# Patient Record
Sex: FEMALE | Race: WHITE | NOT HISPANIC OR LATINO | URBAN - METROPOLITAN AREA
[De-identification: names, ages, dates, MRNs, and addresses within clinical notes are randomized per-mention and may not be internally consistent; named-entity substitution may affect disease eponyms.]

---

## 2017-02-27 ENCOUNTER — EMERGENCY (EMERGENCY)
Facility: HOSPITAL | Age: 32
LOS: 0 days | Discharge: HOME | End: 2017-02-27

## 2017-06-27 DIAGNOSIS — O20.0 THREATENED ABORTION: ICD-10-CM

## 2017-06-27 DIAGNOSIS — Z98.890 OTHER SPECIFIED POSTPROCEDURAL STATES: ICD-10-CM

## 2017-06-27 DIAGNOSIS — Z3A.11 11 WEEKS GESTATION OF PREGNANCY: ICD-10-CM

## 2017-06-27 DIAGNOSIS — N93.9 ABNORMAL UTERINE AND VAGINAL BLEEDING, UNSPECIFIED: ICD-10-CM

## 2017-06-27 DIAGNOSIS — R10.2 PELVIC AND PERINEAL PAIN: ICD-10-CM

## 2021-07-12 PROBLEM — Z00.00 ENCOUNTER FOR PREVENTIVE HEALTH EXAMINATION: Status: ACTIVE | Noted: 2021-07-12

## 2021-07-19 ENCOUNTER — APPOINTMENT (OUTPATIENT)
Dept: SURGERY | Facility: CLINIC | Age: 36
End: 2021-07-19
Payer: COMMERCIAL

## 2021-07-19 VITALS
WEIGHT: 144 LBS | DIASTOLIC BLOOD PRESSURE: 70 MMHG | BODY MASS INDEX: 25.52 KG/M2 | HEART RATE: 70 BPM | SYSTOLIC BLOOD PRESSURE: 100 MMHG | TEMPERATURE: 97.8 F | HEIGHT: 63 IN

## 2021-07-19 DIAGNOSIS — Z78.9 OTHER SPECIFIED HEALTH STATUS: ICD-10-CM

## 2021-07-19 DIAGNOSIS — K58.9 IRRITABLE BOWEL SYNDROME W/OUT DIARRHEA: ICD-10-CM

## 2021-07-19 DIAGNOSIS — K64.5 PERIANAL VENOUS THROMBOSIS: ICD-10-CM

## 2021-07-19 DIAGNOSIS — K59.09 OTHER CONSTIPATION: ICD-10-CM

## 2021-07-19 PROCEDURE — 99203 OFFICE O/P NEW LOW 30 MIN: CPT

## 2021-07-19 RX ORDER — ESCITALOPRAM OXALATE 5 MG/1
5 TABLET ORAL
Qty: 30 | Refills: 0 | Status: ACTIVE | COMMUNITY
Start: 2021-01-13

## 2021-07-19 RX ORDER — IBUPROFEN 600 MG/1
600 TABLET, FILM COATED ORAL
Qty: 28 | Refills: 0 | Status: ACTIVE | COMMUNITY
Start: 2021-06-21

## 2021-07-19 RX ORDER — CLOBETASOL PROPIONATE 0.5 MG/G
0.05 CREAM TOPICAL
Qty: 45 | Refills: 0 | Status: ACTIVE | COMMUNITY
Start: 2021-05-04

## 2021-07-19 RX ORDER — HYDROCORTISONE 25 MG/G
2.5 CREAM TOPICAL
Qty: 28 | Refills: 0 | Status: ACTIVE | COMMUNITY
Start: 2021-06-14

## 2021-07-19 RX ORDER — ADAPALENE AND BENZOYL PEROXIDE 1; 25 MG/G; MG/G
0.1-2.5 GEL TOPICAL
Qty: 45 | Refills: 0 | Status: ACTIVE | COMMUNITY
Start: 2021-07-02

## 2021-07-19 NOTE — PHYSICAL EXAM
[Abdomen Masses] : No abdominal masses [Abdomen Tenderness] : ~T No ~M abdominal tenderness [No HSM] : no hepatosplenomegaly [None] : no anal fissures seen [Excoriation] : no perianal excoriation [Fistula] : no fistulas [Wart] : no warts [Ulcer ___ cm] : no ulcers [Pilonidal Cyst] : no pilonidal cysts [Tender, Swollen] : nontender, non-swollen [Thrombosed] : that was not thrombosed [Skin Tags] : there were no residual hemorrhoidal skin tags seen [Respiratory Effort] : normal respiratory effort [Normal Rate and Rhythm] : normal rate and rhythm [de-identified] : external examination shows a healed incision in the left anteriolateral location [de-identified] : awake, alert and in no acute distress

## 2021-07-19 NOTE — HISTORY OF PRESENT ILLNESS
[FreeTextEntry1] : Patient is a 36F with PMH of constipation who presents for second opinion s/p in office excision of thrombosed external hemorrhoid. Patient states that 6 weeks ago she had a thrombosed external hemorrhoid that was resected by DR. Rhodes. She states this was the second time this has happened. she has previously had issues with constipation but now has daily BM with the use of magnesium.  Patient denies fevers, chills, nausea, vomiting, abdominal pain, diarrhea, blood in his stool or unexpected weight loss.  Patient denies a family history of colon cancer rectal cancer or inflammatory bowel disease.  Patient has never had a colonoscopy.\par

## 2021-07-19 NOTE — ASSESSMENT
[FreeTextEntry1] : 36F s/p excision of thrombosed hemorrhoid\par \par Patient is healing well.  I recommended an increase in water intake, a high fiber diet and fiber supplement to improve her bowel habits.  We will see her back as needed.

## 2021-08-02 ENCOUNTER — APPOINTMENT (OUTPATIENT)
Dept: GASTROENTEROLOGY | Facility: CLINIC | Age: 36
End: 2021-08-02

## 2021-08-09 ENCOUNTER — EMERGENCY (EMERGENCY)
Facility: HOSPITAL | Age: 36
LOS: 0 days | Discharge: HOME | End: 2021-08-09
Attending: EMERGENCY MEDICINE | Admitting: EMERGENCY MEDICINE
Payer: COMMERCIAL

## 2021-08-09 VITALS
WEIGHT: 145.06 LBS | SYSTOLIC BLOOD PRESSURE: 115 MMHG | TEMPERATURE: 98 F | HEIGHT: 64 IN | OXYGEN SATURATION: 100 % | DIASTOLIC BLOOD PRESSURE: 70 MMHG | RESPIRATION RATE: 18 BRPM | HEART RATE: 79 BPM

## 2021-08-09 VITALS — HEART RATE: 88 BPM | SYSTOLIC BLOOD PRESSURE: 122 MMHG | DIASTOLIC BLOOD PRESSURE: 74 MMHG

## 2021-08-09 DIAGNOSIS — R10.13 EPIGASTRIC PAIN: ICD-10-CM

## 2021-08-09 DIAGNOSIS — R11.0 NAUSEA: ICD-10-CM

## 2021-08-09 DIAGNOSIS — Z98.890 OTHER SPECIFIED POSTPROCEDURAL STATES: Chronic | ICD-10-CM

## 2021-08-09 DIAGNOSIS — R10.33 PERIUMBILICAL PAIN: ICD-10-CM

## 2021-08-09 DIAGNOSIS — Z98.890 OTHER SPECIFIED POSTPROCEDURAL STATES: ICD-10-CM

## 2021-08-09 LAB
ALBUMIN SERPL ELPH-MCNC: 4.6 G/DL — SIGNIFICANT CHANGE UP (ref 3.5–5.2)
ALP SERPL-CCNC: 57 U/L — SIGNIFICANT CHANGE UP (ref 30–115)
ALT FLD-CCNC: 9 U/L — SIGNIFICANT CHANGE UP (ref 0–41)
ANION GAP SERPL CALC-SCNC: 10 MMOL/L — SIGNIFICANT CHANGE UP (ref 7–14)
APPEARANCE UR: CLEAR — SIGNIFICANT CHANGE UP
AST SERPL-CCNC: 19 U/L — SIGNIFICANT CHANGE UP (ref 0–41)
BACTERIA # UR AUTO: ABNORMAL
BASOPHILS # BLD AUTO: 0.07 K/UL — SIGNIFICANT CHANGE UP (ref 0–0.2)
BASOPHILS NFR BLD AUTO: 0.7 % — SIGNIFICANT CHANGE UP (ref 0–1)
BILIRUB DIRECT SERPL-MCNC: <0.2 MG/DL — SIGNIFICANT CHANGE UP (ref 0–0.2)
BILIRUB INDIRECT FLD-MCNC: >0 MG/DL — LOW (ref 0.2–1.2)
BILIRUB SERPL-MCNC: 0.2 MG/DL — SIGNIFICANT CHANGE UP (ref 0.2–1.2)
BILIRUB UR-MCNC: NEGATIVE — SIGNIFICANT CHANGE UP
BUN SERPL-MCNC: 16 MG/DL — SIGNIFICANT CHANGE UP (ref 10–20)
CALCIUM SERPL-MCNC: 9.5 MG/DL — SIGNIFICANT CHANGE UP (ref 8.5–10.1)
CHLORIDE SERPL-SCNC: 102 MMOL/L — SIGNIFICANT CHANGE UP (ref 98–110)
CO2 SERPL-SCNC: 27 MMOL/L — SIGNIFICANT CHANGE UP (ref 17–32)
COLOR SPEC: YELLOW — SIGNIFICANT CHANGE UP
CREAT SERPL-MCNC: 0.8 MG/DL — SIGNIFICANT CHANGE UP (ref 0.7–1.5)
DIFF PNL FLD: NEGATIVE — SIGNIFICANT CHANGE UP
EOSINOPHIL # BLD AUTO: 0.48 K/UL — SIGNIFICANT CHANGE UP (ref 0–0.7)
EOSINOPHIL NFR BLD AUTO: 4.7 % — SIGNIFICANT CHANGE UP (ref 0–8)
EPI CELLS # UR: ABNORMAL /HPF
GLUCOSE SERPL-MCNC: 75 MG/DL — SIGNIFICANT CHANGE UP (ref 70–99)
GLUCOSE UR QL: NEGATIVE MG/DL — SIGNIFICANT CHANGE UP
HCT VFR BLD CALC: 41.3 % — SIGNIFICANT CHANGE UP (ref 37–47)
HGB BLD-MCNC: 13.8 G/DL — SIGNIFICANT CHANGE UP (ref 12–16)
IMM GRANULOCYTES NFR BLD AUTO: 0.3 % — SIGNIFICANT CHANGE UP (ref 0.1–0.3)
KETONES UR-MCNC: NEGATIVE — SIGNIFICANT CHANGE UP
LEUKOCYTE ESTERASE UR-ACNC: ABNORMAL
LIDOCAIN IGE QN: 32 U/L — SIGNIFICANT CHANGE UP (ref 7–60)
LYMPHOCYTES # BLD AUTO: 2.93 K/UL — SIGNIFICANT CHANGE UP (ref 1.2–3.4)
LYMPHOCYTES # BLD AUTO: 29 % — SIGNIFICANT CHANGE UP (ref 20.5–51.1)
MCHC RBC-ENTMCNC: 31.3 PG — HIGH (ref 27–31)
MCHC RBC-ENTMCNC: 33.4 G/DL — SIGNIFICANT CHANGE UP (ref 32–37)
MCV RBC AUTO: 93.7 FL — SIGNIFICANT CHANGE UP (ref 81–99)
MONOCYTES # BLD AUTO: 0.7 K/UL — HIGH (ref 0.1–0.6)
MONOCYTES NFR BLD AUTO: 6.9 % — SIGNIFICANT CHANGE UP (ref 1.7–9.3)
NEUTROPHILS # BLD AUTO: 5.9 K/UL — SIGNIFICANT CHANGE UP (ref 1.4–6.5)
NEUTROPHILS NFR BLD AUTO: 58.4 % — SIGNIFICANT CHANGE UP (ref 42.2–75.2)
NITRITE UR-MCNC: NEGATIVE — SIGNIFICANT CHANGE UP
NRBC # BLD: 0 /100 WBCS — SIGNIFICANT CHANGE UP (ref 0–0)
PH UR: 6 — SIGNIFICANT CHANGE UP (ref 5–8)
PLATELET # BLD AUTO: 291 K/UL — SIGNIFICANT CHANGE UP (ref 130–400)
POTASSIUM SERPL-MCNC: 4.3 MMOL/L — SIGNIFICANT CHANGE UP (ref 3.5–5)
POTASSIUM SERPL-SCNC: 4.3 MMOL/L — SIGNIFICANT CHANGE UP (ref 3.5–5)
PROT SERPL-MCNC: 7.3 G/DL — SIGNIFICANT CHANGE UP (ref 6–8)
PROT UR-MCNC: NEGATIVE MG/DL — SIGNIFICANT CHANGE UP
RBC # BLD: 4.41 M/UL — SIGNIFICANT CHANGE UP (ref 4.2–5.4)
RBC # FLD: 12.1 % — SIGNIFICANT CHANGE UP (ref 11.5–14.5)
SODIUM SERPL-SCNC: 139 MMOL/L — SIGNIFICANT CHANGE UP (ref 135–146)
SP GR SPEC: 1.01 — SIGNIFICANT CHANGE UP (ref 1.01–1.03)
UROBILINOGEN FLD QL: 0.2 MG/DL — SIGNIFICANT CHANGE UP (ref 0.2–0.2)
WBC # BLD: 10.11 K/UL — SIGNIFICANT CHANGE UP (ref 4.8–10.8)
WBC # FLD AUTO: 10.11 K/UL — SIGNIFICANT CHANGE UP (ref 4.8–10.8)
WBC UR QL: ABNORMAL /HPF

## 2021-08-09 PROCEDURE — 99285 EMERGENCY DEPT VISIT HI MDM: CPT

## 2021-08-09 PROCEDURE — 74177 CT ABD & PELVIS W/CONTRAST: CPT | Mod: 26,MA

## 2021-08-09 RX ORDER — FAMOTIDINE 10 MG/ML
20 INJECTION INTRAVENOUS ONCE
Refills: 0 | Status: COMPLETED | OUTPATIENT
Start: 2021-08-09 | End: 2021-08-09

## 2021-08-09 RX ADMIN — FAMOTIDINE 20 MILLIGRAM(S): 10 INJECTION INTRAVENOUS at 19:21

## 2021-08-09 NOTE — ED PROVIDER NOTE - OBJECTIVE STATEMENT
Pt is a 35 yo F complaining of abdominal pain Pt is a 37 yo F complaining of abdominal pain . Pt reports that she has been having worsening epigastric pain and nausea for the last few weeks worsening in the last few days . No association with food, no diarrhea, no vomiting. No fever. No urinary symptoms. LMP ~ 3weeks ago.

## 2021-08-09 NOTE — ED ADULT TRIAGE NOTE - CHIEF COMPLAINT QUOTE
abd pain [id number]; s/p hemorrhoidectomy, pt states since then "I have been having GI issues, either constipated or diarrhea, I have bloating in my stomach and the pain has gotten worse"

## 2021-08-09 NOTE — ED ADULT NURSE NOTE - CHIEF COMPLAINT QUOTE
abd pain [id number]; s/p hemorrhoidectomy, pt states since then "I have been having GI issues, either constipated or diarrhea, I have bloating in my stomach and the pain has gotten worse"

## 2021-08-09 NOTE — ED PROVIDER NOTE - PATIENT PORTAL LINK FT
You can access the FollowMyHealth Patient Portal offered by [facility] by registering at the following website: [email or web address]. By joining [employer]’s FollowMyHealth portal, you will also be able to view your health information using other applications (apps) compatible with our system.

## 2021-08-09 NOTE — ED PROVIDER NOTE - ATTENDING CONTRIBUTION TO CARE
36F p/w diffuse abd pain- worse in the epigastrium. associated with n/v/ and food    Vitals reviewed by me noted to be wnl.     On physical exam with periumbilical abd pain    pending labs/ct    Labs reviewed by me, values noted to be within normal limits.     case s/o to  pending ct and final dispo./

## 2021-08-09 NOTE — ED PROVIDER NOTE - NS ED ROS FT
1) No driving for 1-2 weeks and no longer taking narcotics.   2) May shower / sponge bathe >24 hours after surgery, No tub baths/soaking  3) Do not lift / push / pull more then 20 lb. for 6 weeks  4) Pelvic rest for 6 weeks  5) Constipation is a common postoperative complaint.  Please use a stool softeners and laxatives as needed to facilitate bowel movements.  6) If you are discharged with an abdominal binder, this is to be used as needed for incisional comfort.   Constitutional: (-) fever  Eyes/ENT: (-) blurry vision, (-) epistaxis  Cardiovascular: (-) chest pain, (-) syncope  Respiratory: (-) cough, (-) shortness of breath  Gastrointestinal: (-) vomiting, (-) diarrhea  Musculoskeletal: (-) neck pain, (-) back pain, (-) joint pain  Integumentary: (-) rash, (-) edema  Neurological: (-) headache, (-) altered mental status  Psychiatric: (-) hallucinations  Allergic/Immunologic: (-) pruritus

## 2021-08-09 NOTE — ED ADULT NURSE NOTE - EXTENSIONS OF SELF_ADULT
Nursing Note by Petra Balderrama RN at 17 10:44 AM     Author:  Petra Balderrama RN Service:  (none) Author Type:  Registered Nurse     Filed:  17 10:45 AM Encounter Date:  3/2/2017 Status:  Signed     :  Petra Balderrama RN (Registered Nurse)            10:44 AM  Chief Complaint     Patient presents with     • Cough      chest feels \"tight\" with cough, sore throat x 2 days. Denies fever     Patient brought to exam room.  Electronically Signed by:    Petra Balderrama RN , 3/2/2017  Patient's name,  and allergies verified with[MM1.1T] patient and mother[MM1.1M].  Last visit with ANTONIO LAMAS was on No match found  Last visit with [facility] was on 2016 at  8:10 AM in [facility] SEQ  Match done based on reference date of today 3/2/17  Rashaun Nielsen was offered treatment for pain control:[MM1.1T] No.   Will wait for MD to evaluate.[MM1.1M]         Revision History        User Key Date/Time User Provider Type Action    > MM1.1 17 10:45 AM Petra Balderrama RN Registered Nurse Sign    M - Manual, T - Template             None

## 2021-08-09 NOTE — ED PROVIDER NOTE - PHYSICAL EXAMINATION
CONSTITUTIONAL: Well-developed; well-nourished; in no acute distress.   SKIN: warm, dry  HEAD: Normocephalic; atraumatic.  EYES: PERRL, EOMI, normal sclera and conjunctiva   ENT: No nasal discharge; airway clear.  NECK: Supple; non tender.  CARD:  Regular rate and rhythm.   RESP: NO inc WOB   ABD: soft ntnd, + TTP in suprapubic area   EXT: Normal ROM.    LYMPH: No acute cervical adenopathy.  NEURO: Alert, oriented, grossly unremarkable  PSYCH: Cooperative, appropriate.

## 2021-08-09 NOTE — ED PROVIDER NOTE - CARE PROVIDER_API CALL
Reinier Ge)  Gastroenterology; Internal Medicine  [address] [place] [address]  Phone: [phone]  Fax: [phone]  Follow Up Time: Routine

## 2021-08-09 NOTE — ED PROVIDER NOTE - CLINICAL SUMMARY MEDICAL DECISION MAKING FREE TEXT BOX
36yF pw  upper abdominal pain a/w nv    labs reviewed wnl   CT No evidence of bowel obstruction colitis or appendicitis.  	2.  Multiple indeterminate hepatic lesions measuring up to 2.2 cm. Hemangiomas are considerations among other etiologies. Further evaluation with outpatient contrast enhanced MRI is recommended.   DW patient  aware  of need for  follow up imaging.  Pt made aware f ventral  fat containing hernia  as well .  pt abdomen  is soft and nontender on dc      Patient to be discharged from ED in well appearing condition. Any available test results were discussed with and printed  for patient.  Verbal instructions given, including instructions to return to ED immediately for any new, worsening, or concerning symptoms. Limitations of ED work up discussed.  Patient reports understanding of above with capacity and insight. Written discharge instructions additionally given, including follow-up plan.

## 2021-08-10 LAB
CULTURE RESULTS: NO GROWTH — SIGNIFICANT CHANGE UP
SPECIMEN SOURCE: SIGNIFICANT CHANGE UP

## 2021-08-24 ENCOUNTER — APPOINTMENT (OUTPATIENT)
Dept: SURGERY | Facility: CLINIC | Age: 36
End: 2021-08-24
Payer: COMMERCIAL

## 2021-08-24 VITALS — HEIGHT: 63 IN | WEIGHT: 145 LBS | BODY MASS INDEX: 25.69 KG/M2

## 2021-08-24 DIAGNOSIS — M62.08 SEPARATION OF MUSCLE (NONTRAUMATIC), OTHER SITE: ICD-10-CM

## 2021-08-24 PROCEDURE — 99213 OFFICE O/P EST LOW 20 MIN: CPT

## 2021-08-24 NOTE — ASSESSMENT
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 She has a very active lifestyle and is often doing heavy lifting and strenuous activity on a regular basis.\par \par Physical examination demonstrates a grape-sized tender bulge at the umbilicus which is easily reducible along with a small blueberry-sized bulge 1-2 fingerbreadths above the umbilicus which is also reducible with a moderate degree of difficulty both requiring surgical repair. These hernias are complicated by a mild to moderate diastasis recti measuring approximately 25 mm likely related to her 2 previous full-term pregnancies in the past. Her current BMI is 26.\par \par I explained the pros and cons of surgery, as well as all risks, benefits, indications and alternatives of the procedure and the patient understood and agreed. She is currently in the process of getting a divorce and school starts back in under 3 weeks. She will talk this over with work and family and call back to schedule in the near future. Aster is aware that the repair of her umbilical hernia with mesh in the repair of her ventral hernia with mesh will be done under LOCAL with IV SEDATION at [facility] with presurgical testing waived.  She was encouraged to avoid heavy lifting and strenuous activity in the interim, of course.\par \par Of note, Aster has sought consultation with a plastic surgeon not affiliated with [facility] or [facility] as she was contemplating a simultaneous panniculectomy versus abdominoplasty. I recommended she seek consultation with my colleague Dr. Ra Hamilton if she still wants to do simultaneous surgery with me. Currently, with regard to her diastasis recti, I recommended that she wear an abdominal binder during any significant physical activity.\par \par Also of note, although we did not discuss this during her consultation, she did make note on her paperwork of a history of a blood clot and/or bleeding disorder in the past. If/when she calls to schedule her procedure, we will further clarify this issue with her and take any appropriate steps needed perioperatively.

## 2021-08-24 NOTE — PHYSICAL EXAM
[JVD] : no jugular venous distention  [Normal Breath Sounds] : Normal breath sounds [No Rash or Lesion] : No rash or lesion [Alert] : alert [Anxious] : anxious [de-identified] : healthy [de-identified] : normal [de-identified] : moderate umbilical hernia, reducible; small supraumbilical ventral hernia, reducible [de-identified] : soft and flat abdomen, 2.5 cm diastasis recti\par \par

## 2021-08-31 ENCOUNTER — OUTPATIENT (OUTPATIENT)
Dept: OUTPATIENT SERVICES | Facility: HOSPITAL | Age: 36
LOS: 1 days | Discharge: HOME | End: 2021-08-31

## 2021-08-31 ENCOUNTER — LABORATORY RESULT (OUTPATIENT)
Age: 36
End: 2021-08-31

## 2021-08-31 DIAGNOSIS — Z98.890 OTHER SPECIFIED POSTPROCEDURAL STATES: Chronic | ICD-10-CM

## 2021-08-31 DIAGNOSIS — Z11.59 ENCOUNTER FOR SCREENING FOR OTHER VIRAL DISEASES: ICD-10-CM

## 2021-09-03 ENCOUNTER — APPOINTMENT (OUTPATIENT)
Dept: SURGERY | Facility: AMBULATORY SURGERY CENTER | Age: 36
End: 2021-09-03

## 2021-09-14 ENCOUNTER — APPOINTMENT (OUTPATIENT)
Dept: SURGERY | Facility: CLINIC | Age: 36
End: 2021-09-14

## 2021-09-23 ENCOUNTER — APPOINTMENT (OUTPATIENT)
Dept: GASTROENTEROLOGY | Facility: CLINIC | Age: 36
End: 2021-09-23

## 2021-10-08 ENCOUNTER — OUTPATIENT (OUTPATIENT)
Dept: OUTPATIENT SERVICES | Facility: HOSPITAL | Age: 36
LOS: 1 days | Discharge: HOME | End: 2021-10-08

## 2021-10-08 ENCOUNTER — APPOINTMENT (OUTPATIENT)
Dept: SURGERY | Facility: AMBULATORY SURGERY CENTER | Age: 36
End: 2021-10-08
Payer: COMMERCIAL

## 2021-10-08 VITALS
HEIGHT: 63 IN | DIASTOLIC BLOOD PRESSURE: 66 MMHG | SYSTOLIC BLOOD PRESSURE: 105 MMHG | OXYGEN SATURATION: 100 % | RESPIRATION RATE: 18 BRPM | WEIGHT: 143.96 LBS | TEMPERATURE: 99 F | HEART RATE: 76 BPM

## 2021-10-08 VITALS — RESPIRATION RATE: 23 BRPM | HEART RATE: 66 BPM | DIASTOLIC BLOOD PRESSURE: 70 MMHG | SYSTOLIC BLOOD PRESSURE: 116 MMHG

## 2021-10-08 DIAGNOSIS — Z98.890 OTHER SPECIFIED POSTPROCEDURAL STATES: Chronic | ICD-10-CM

## 2021-10-08 PROCEDURE — 49568: CPT

## 2021-10-08 PROCEDURE — 49561: CPT

## 2021-10-08 PROCEDURE — 49585: CPT | Mod: XS

## 2021-10-08 RX ORDER — MORPHINE SULFATE 50 MG/1
2 CAPSULE, EXTENDED RELEASE ORAL
Refills: 0 | Status: DISCONTINUED | OUTPATIENT
Start: 2021-10-08 | End: 2021-10-08

## 2021-10-08 RX ORDER — HEPARIN SODIUM 5000 [USP'U]/ML
5000 INJECTION INTRAVENOUS; SUBCUTANEOUS ONCE
Refills: 0 | Status: COMPLETED | OUTPATIENT
Start: 2021-10-08 | End: 2021-10-08

## 2021-10-08 RX ORDER — SODIUM CHLORIDE 9 MG/ML
1000 INJECTION, SOLUTION INTRAVENOUS
Refills: 0 | Status: DISCONTINUED | OUTPATIENT
Start: 2021-10-08 | End: 2021-10-22

## 2021-10-08 RX ADMIN — MORPHINE SULFATE 2 MILLIGRAM(S): 50 CAPSULE, EXTENDED RELEASE ORAL at 12:09

## 2021-10-08 RX ADMIN — HEPARIN SODIUM 5000 UNIT(S): 5000 INJECTION INTRAVENOUS; SUBCUTANEOUS at 10:38

## 2021-10-08 RX ADMIN — MORPHINE SULFATE 2 MILLIGRAM(S): 50 CAPSULE, EXTENDED RELEASE ORAL at 12:50

## 2021-10-08 RX ADMIN — SODIUM CHLORIDE 100 MILLILITER(S): 9 INJECTION, SOLUTION INTRAVENOUS at 12:00

## 2021-10-08 NOTE — BRIEF OPERATIVE NOTE - NSICDXBRIEFPOSTOP_GEN_ALL_CORE_FT
POST-OP DIAGNOSIS:  Ventral hernia 08-Oct-2021 10:40:27 Incarcerated Jim Reid  Umbilical hernia 08-Oct-2021 10:40:29  Jim Reid

## 2021-10-08 NOTE — PRE-ANESTHESIA EVALUATION ADULT - HEIGHT IN FEET
[Well Developed] : well developed [Well Nourished] : well nourished 5 [No Acute Distress] : no acute distress [Normal Conjunctiva] : normal conjunctiva [Normal Venous Pressure] : normal venous pressure [No Carotid Bruit] : no carotid bruit [Normal S1, S2] : normal S1, S2 [No Murmur] : no murmur [No Rub] : no rub [No Gallop] : no gallop [Clear Lung Fields] : clear lung fields [Good Air Entry] : good air entry [No Respiratory Distress] : no respiratory distress  [Soft] : abdomen soft [Non Tender] : non-tender [No Masses/organomegaly] : no masses/organomegaly [Normal Bowel Sounds] : normal bowel sounds [Normal Gait] : normal gait [No Edema] : no edema [No Cyanosis] : no cyanosis [No Clubbing] : no clubbing [No Varicosities] : no varicosities [No Rash] : no rash [No Skin Lesions] : no skin lesions [Moves all extremities] : moves all extremities [No Focal Deficits] : no focal deficits [Normal Speech] : normal speech [Alert and Oriented] : alert and oriented [Normal memory] : normal memory

## 2021-10-08 NOTE — BRIEF OPERATIVE NOTE - NSICDXBRIEFPREOP_GEN_ALL_CORE_FT
PRE-OP DIAGNOSIS:  Ventral hernia 08-Oct-2021 10:40:18  Jim Reid  Umbilical hernia 08-Oct-2021 10:40:19  Jim Reid

## 2021-10-08 NOTE — ASU DISCHARGE PLAN (ADULT/PEDIATRIC) - CARE PROVIDER_API CALL
Jim Reid)  Surgery  [address]  [place] [address]  Phone: [phone]  Fax: [phone]  Scheduled Appointment: 10/19/2021

## 2021-10-08 NOTE — BRIEF OPERATIVE NOTE - NSICDXBRIEFPROCEDURE_GEN_ALL_CORE_FT
PROCEDURES:  Repair of ventral hernia with mesh 08-Oct-2021 10:40:35 Incarcerated Jim Reid  Umbilical hernia repair with mesh 08-Oct-2021 10:40:36  Jim Reid

## 2021-10-08 NOTE — CHART NOTE - NSCHARTNOTEFT_GEN_A_CORE
PACU ANESTHESIA ADMISSION NOTE      Procedure: Repair of ventral hernia with mesh  Incarcerated    Umbilical hernia repair with mesh      Post op diagnosis:  Ventral hernia    Umbilical hernia        ____  Intubated  TV:______       Rate: ______      FiO2: ______    __x__  Patent Airway    _x___  Full return of protective reflexes    __x__  Full recovery from anesthesia / back to baseline     Vitals:   T: 36.2          R: 16                 BP: 115/74                 Sat:  99                 P: 74      Mental Status:  __x__ Awake   __x___ Alert   _____ Drowsy   _____ Sedated    Nausea/Vomiting:  __x__ NO  ______Yes,   See Post - Op Orders          Pain Scale (0-10):  x_____    Treatment: ____ None    ____ See Post - Op/PCA Orders    Post - Operative Fluids:   ____ Oral   _x___ See Post - Op Orders    Plan: Discharge:   __x__Home       _____Floor     _____Critical Care    _____  Other:_________________    Comments: tolerated procedure well

## 2021-10-14 DIAGNOSIS — K42.9 UMBILICAL HERNIA WITHOUT OBSTRUCTION OR GANGRENE: ICD-10-CM

## 2021-10-14 DIAGNOSIS — K43.6 OTHER AND UNSPECIFIED VENTRAL HERNIA WITH OBSTRUCTION, WITHOUT GANGRENE: ICD-10-CM

## 2021-10-18 ENCOUNTER — EMERGENCY (EMERGENCY)
Facility: HOSPITAL | Age: 36
LOS: 0 days | Discharge: HOME | End: 2021-10-19
Attending: EMERGENCY MEDICINE | Admitting: EMERGENCY MEDICINE
Payer: COMMERCIAL

## 2021-10-18 VITALS
SYSTOLIC BLOOD PRESSURE: 142 MMHG | DIASTOLIC BLOOD PRESSURE: 73 MMHG | HEIGHT: 63 IN | TEMPERATURE: 97 F | OXYGEN SATURATION: 100 % | HEART RATE: 67 BPM | RESPIRATION RATE: 18 BRPM

## 2021-10-18 VITALS
DIASTOLIC BLOOD PRESSURE: 67 MMHG | OXYGEN SATURATION: 99 % | SYSTOLIC BLOOD PRESSURE: 119 MMHG | TEMPERATURE: 98 F | RESPIRATION RATE: 16 BRPM | HEART RATE: 65 BPM

## 2021-10-18 DIAGNOSIS — R11.0 NAUSEA: ICD-10-CM

## 2021-10-18 DIAGNOSIS — O36.80X0 PREGNANCY WITH INCONCLUSIVE FETAL VIABILITY, NOT APPLICABLE OR UNSPECIFIED: ICD-10-CM

## 2021-10-18 DIAGNOSIS — O20.9 HEMORRHAGE IN EARLY PREGNANCY, UNSPECIFIED: ICD-10-CM

## 2021-10-18 DIAGNOSIS — Z3A.01 LESS THAN 8 WEEKS GESTATION OF PREGNANCY: ICD-10-CM

## 2021-10-18 DIAGNOSIS — Z98.890 OTHER SPECIFIED POSTPROCEDURAL STATES: Chronic | ICD-10-CM

## 2021-10-18 DIAGNOSIS — R10.2 PELVIC AND PERINEAL PAIN: ICD-10-CM

## 2021-10-18 DIAGNOSIS — Z32.01 ENCOUNTER FOR PREGNANCY TEST, RESULT POSITIVE: ICD-10-CM

## 2021-10-18 LAB
ALBUMIN SERPL ELPH-MCNC: 4.1 G/DL — SIGNIFICANT CHANGE UP (ref 3.5–5.2)
ALP SERPL-CCNC: 46 U/L — SIGNIFICANT CHANGE UP (ref 30–115)
ALT FLD-CCNC: 8 U/L — SIGNIFICANT CHANGE UP (ref 0–41)
ANION GAP SERPL CALC-SCNC: 13 MMOL/L — SIGNIFICANT CHANGE UP (ref 7–14)
APPEARANCE UR: ABNORMAL
APTT BLD: 31.9 SEC — SIGNIFICANT CHANGE UP (ref 27–39.2)
AST SERPL-CCNC: 18 U/L — SIGNIFICANT CHANGE UP (ref 0–41)
BASOPHILS # BLD AUTO: 0.05 K/UL — SIGNIFICANT CHANGE UP (ref 0–0.2)
BASOPHILS NFR BLD AUTO: 0.5 % — SIGNIFICANT CHANGE UP (ref 0–1)
BILIRUB SERPL-MCNC: 0.3 MG/DL — SIGNIFICANT CHANGE UP (ref 0.2–1.2)
BLD GP AB SCN SERPL QL: SIGNIFICANT CHANGE UP
BUN SERPL-MCNC: 13 MG/DL — SIGNIFICANT CHANGE UP (ref 10–20)
CALCIUM SERPL-MCNC: 9.4 MG/DL — SIGNIFICANT CHANGE UP (ref 8.5–10.1)
CHLORIDE SERPL-SCNC: 105 MMOL/L — SIGNIFICANT CHANGE UP (ref 98–110)
CO2 SERPL-SCNC: 22 MMOL/L — SIGNIFICANT CHANGE UP (ref 17–32)
COLOR SPEC: ABNORMAL
CREAT SERPL-MCNC: 0.8 MG/DL — SIGNIFICANT CHANGE UP (ref 0.7–1.5)
EOSINOPHIL # BLD AUTO: 0.5 K/UL — SIGNIFICANT CHANGE UP (ref 0–0.7)
EOSINOPHIL NFR BLD AUTO: 4.6 % — SIGNIFICANT CHANGE UP (ref 0–8)
GLUCOSE SERPL-MCNC: 98 MG/DL — SIGNIFICANT CHANGE UP (ref 70–99)
HCG SERPL-ACNC: 3330 MIU/ML — HIGH
HCT VFR BLD CALC: 35 % — LOW (ref 37–47)
HGB BLD-MCNC: 12 G/DL — SIGNIFICANT CHANGE UP (ref 12–16)
IMM GRANULOCYTES NFR BLD AUTO: 0.4 % — HIGH (ref 0.1–0.3)
INR BLD: 1.29 RATIO — SIGNIFICANT CHANGE UP (ref 0.65–1.3)
LACTATE SERPL-SCNC: 1 MMOL/L — SIGNIFICANT CHANGE UP (ref 0.7–2)
LIDOCAIN IGE QN: 34 U/L — SIGNIFICANT CHANGE UP (ref 7–60)
LYMPHOCYTES # BLD AUTO: 2.32 K/UL — SIGNIFICANT CHANGE UP (ref 1.2–3.4)
LYMPHOCYTES # BLD AUTO: 21.5 % — SIGNIFICANT CHANGE UP (ref 20.5–51.1)
MCHC RBC-ENTMCNC: 31.7 PG — HIGH (ref 27–31)
MCHC RBC-ENTMCNC: 34.3 G/DL — SIGNIFICANT CHANGE UP (ref 32–37)
MCV RBC AUTO: 92.3 FL — SIGNIFICANT CHANGE UP (ref 81–99)
MONOCYTES # BLD AUTO: 0.69 K/UL — HIGH (ref 0.1–0.6)
MONOCYTES NFR BLD AUTO: 6.4 % — SIGNIFICANT CHANGE UP (ref 1.7–9.3)
NEUTROPHILS # BLD AUTO: 7.18 K/UL — HIGH (ref 1.4–6.5)
NEUTROPHILS NFR BLD AUTO: 66.6 % — SIGNIFICANT CHANGE UP (ref 42.2–75.2)
NRBC # BLD: 0 /100 WBCS — SIGNIFICANT CHANGE UP (ref 0–0)
PLATELET # BLD AUTO: 284 K/UL — SIGNIFICANT CHANGE UP (ref 130–400)
POTASSIUM SERPL-MCNC: 4.5 MMOL/L — SIGNIFICANT CHANGE UP (ref 3.5–5)
POTASSIUM SERPL-SCNC: 4.5 MMOL/L — SIGNIFICANT CHANGE UP (ref 3.5–5)
PROT SERPL-MCNC: 6.8 G/DL — SIGNIFICANT CHANGE UP (ref 6–8)
PROTHROM AB SERPL-ACNC: 14.8 SEC — HIGH (ref 9.95–12.87)
RBC # BLD: 3.79 M/UL — LOW (ref 4.2–5.4)
RBC # FLD: 12.3 % — SIGNIFICANT CHANGE UP (ref 11.5–14.5)
SODIUM SERPL-SCNC: 140 MMOL/L — SIGNIFICANT CHANGE UP (ref 135–146)
SP GR SPEC: 1.02 — SIGNIFICANT CHANGE UP (ref 1.01–1.03)
WBC # BLD: 10.78 K/UL — SIGNIFICANT CHANGE UP (ref 4.8–10.8)
WBC # FLD AUTO: 10.78 K/UL — SIGNIFICANT CHANGE UP (ref 4.8–10.8)

## 2021-10-18 PROCEDURE — 99285 EMERGENCY DEPT VISIT HI MDM: CPT

## 2021-10-18 PROCEDURE — 76830 TRANSVAGINAL US NON-OB: CPT | Mod: 26

## 2021-10-18 RX ORDER — SODIUM CHLORIDE 9 MG/ML
1000 INJECTION INTRAMUSCULAR; INTRAVENOUS; SUBCUTANEOUS ONCE
Refills: 0 | Status: COMPLETED | OUTPATIENT
Start: 2021-10-18 | End: 2021-10-18

## 2021-10-18 RX ORDER — ACETAMINOPHEN 500 MG
975 TABLET ORAL ONCE
Refills: 0 | Status: DISCONTINUED | OUTPATIENT
Start: 2021-10-18 | End: 2021-10-19

## 2021-10-18 RX ORDER — ONDANSETRON 8 MG/1
4 TABLET, FILM COATED ORAL ONCE
Refills: 0 | Status: COMPLETED | OUTPATIENT
Start: 2021-10-18 | End: 2021-10-18

## 2021-10-18 RX ADMIN — SODIUM CHLORIDE 2000 MILLILITER(S): 9 INJECTION INTRAMUSCULAR; INTRAVENOUS; SUBCUTANEOUS at 19:55

## 2021-10-18 RX ADMIN — ONDANSETRON 4 MILLIGRAM(S): 8 TABLET, FILM COATED ORAL at 19:55

## 2021-10-18 NOTE — ED PROVIDER NOTE - PHYSICAL EXAMINATION
PHYSICAL EXAM:    GENERAL: Alert, appears stated age, well appearing, non-toxic  SKIN: Warm, pink and dry. MMM.   HEAD: NC, AT  EYE: Normal lids/conjunctiva  ENT: Normal hearing, patent oropharynx  NECK: +supple. No meningismus, or JVD  Pulm: Bilateral BS, normal resp effort, no wheezes, stridor, or retractions  CV: RRR, no M/R/G, 2+and = radial pulses  Abd: soft, non-tender, non-distended, no rebound/guarding. +R pelvic TTP.   Mskel: no erythema, cyanosis, edema. no calf tenderness  Neuro: AAOx3,. normal gait.

## 2021-10-18 NOTE — ED PROVIDER NOTE - CLINICAL SUMMARY MEDICAL DECISION MAKING FREE TEXT BOX
pt signed out to me by Dr. Romo. Pt cleared by OB for outpatient f/u. Pt agrees w outpatient plan of repeat beta hcg in 48 hrs and f/u w Dr. Villafuerte. Pt provided w ectopic return precautions. Full DC instructions discussed and patient knows when to seek immediate medical attention. Patient has proper follow-up. All results discussed with the patient they may require further work-up. Limitations of ED work-up discussed. All  questions and concerns from patient or family addressed. Understanding of insturctions verbalized

## 2021-10-18 NOTE — ED PROVIDER NOTE - NSFOLLOWUPINSTRUCTIONS_ED_ALL_ED_FT
Human Chorionic Gonadotropin Test      Why am I having this test?  A human chorionic gonadotropin (hCG) test is done to determine whether you are pregnant. It can also be used:  •To diagnose an abnormal pregnancy.      •To determine whether you have had a failed pregnancy (miscarriage) or are at risk of one.        What is being tested?    This test checks the level of the human chorionic gonadotropin (hCG) hormone in the blood. This hormone is produced during pregnancy by the cells that form the placenta. The placenta is the organ that grows inside your womb (uterus) to nourish a developing baby. When you are pregnant, hCG can be detected in your blood or urine 7 to 8 days before your missed period. It continues to go up for the first 8–10 weeks of pregnancy.  The presence of hCG in your blood can be measured with several different types of tests. You may have:•A urine test.  •Because this hormone is eliminated from your body by your kidneys, you may have a urine test to find out whether you are pregnant. A home pregnancy test detects whether there is hCG in your urine.      •A urine test only shows whether there is hCG in your urine. It does not measure how much.      •A qualitative blood test.  •You may have this type of blood test to find out if you are pregnant.      •This blood test only shows whether there is hCG in your blood. It does not measure how much.      •A quantitative blood test.  •This type of blood test measures the amount of hCG in your blood.    •You may have this test to:  •Diagnose an abnormal pregnancy.      •Check whether you have had a miscarriage.      •Determine whether you are at risk of a miscarriage.            What kind of sample is taken?                Two kinds of samples may be collected to test for the hCG hormone.  •Blood. It is usually collected by inserting a needle into a blood vessel.      •Urine. It is usually collected by urinating into a germ-free (sterile) specimen cup. It is best to collect the sample the first time you urinate in the morning.        How do I prepare for this test?    No preparation is needed for a blood test.   For the urine test:•Let your health care provider know about:  •All medicines you are taking, including vitamins, herbs, creams, and over-the-counter medicines.      •Any blood in your urine. This may interfere with the result.        •Do not drink too much fluid. Drink as you normally would, or as directed by your health care provider.        How are the results reported?  Depending on the type of test that you have, your test results may be reported as values. Your health care provider will compare your results to normal ranges that were established after testing a large group of people (reference ranges). Reference ranges may vary among labs and hospitals. For this test, common reference ranges that show absence of pregnancy are:  •Quantitative hCG blood levels: less than 5 IU/L.    Other results will be reported as either positive or negative. For this test, normal results (meaning the absence of pregnancy) are:  •Negative for hCG in the urine test.      •Negative for hCG in the qualitative blood test.        What do the results mean?    Urine and qualitative blood test   •A negative result could mean:  •That you are not pregnant.      •That the test was done too early in your pregnancy to detect hCG in your blood or urine. If you still have other signs of pregnancy, the test will be repeated.      •A positive result means:  •That you are most likely pregnant. Your health care provider may confirm your pregnancy with an imaging study (ultrasound) of your uterus, if needed.        Quantitative blood test   Results of the quantitative hCG blood test will be interpreted as follows:  •Less than 5 IU/L: You are most likely not pregnant.      •Greater than 25 IU/L: You are most likely pregnant.    •hCG levels that are higher than expected:  •You are pregnant with twins.      •You have abnormal growths in the uterus.      •hCG levels that are rising more slowly than expected:  •You have an ectopic pregnancy (also called a tubal pregnancy).      •hCG levels that are falling:  •You may be having a miscarriage.        Talk with your health care provider about what your results mean.      Questions to ask your health care provider  Ask your health care provider, or the department that is doing the test:  •When will my results be ready?       •How will I get my results?       •What are my treatment options?       •What other tests do I need?       •What are my next steps?        Summary    •A human chorionic gonadotropin test is done to determine whether you are pregnant.      •When you are pregnant, hCG can be detected in your blood or urine 7 to 8 days before your missed period. It continues to go up for the first 8–10 weeks of pregnancy.      •Your hCG level can be measured with different types of tests. You may have a urine test, a qualitative blood test, or a quantitative blood test.      •Talk with your health care provider about what your results mean.      This information is not intended to replace advice given to you by your health care provider. Make sure you discuss any questions you have with your health care provider.

## 2021-10-18 NOTE — ED PROVIDER NOTE - CARE PROVIDER_API CALL
Jasper Villafuerte)  Obstetrics and Gynecology  [address] [place] [address]  Phone: [phone]  Fax: [phone]  Follow Up Time: 1-3 Days

## 2021-10-18 NOTE — ED PROVIDER NOTE - PROGRESS NOTE DETAILS
Dr. Romo - On initial evaluation patient bedside is angry about current situation and providing limited information, refusing examination.  No exam done by me at patient request. Multiple inflammatory comments made to staff and de-escalation provided by me. I also wheeled her to ultrasound personally as she was still in transport tracking at that time. Dr. Romo - called to Radiology to prompt reading of sono. Prelim images reviewed but not interpretable at this time. Dr. Romo - elier d/w Dr. Lee. Will advise patient of results. GYN to eval shortly. BETTIE NAJERA: [place] teamsed obgyn resident. discussed current results with pt. Dr. Romo - patient is from Dr. Villafuerte's service, residents will perform consult.

## 2021-10-18 NOTE — ED PROVIDER NOTE - PATIENT PORTAL LINK FT
You can access the FollowMyHealth Patient Portal offered by [facility] by registering at the following website: [email or web address]. By joining [employer]’s FollowMyHealth portal, you will also be able to view your health information using other applications (apps) compatible with our system.

## 2021-10-18 NOTE — ED PROVIDER NOTE - ATTENDING CONTRIBUTION TO CARE
I personally evaluated the patient. I reviewed the Resident’s or Physician Assistant’s note (as assigned above), and agree with the findings and plan except as documented in my note.     36 female here for pelvic pain and vaginal bleeding. Clinical decisions discussed with PA and reviewed notes to date in place of bedside HPI. Patient is here for pelvic pain, states she is concerned for ectopic pregnancy as she had prior and her symptoms are similar. Voices her concerns about potential delay in ectopic diagnosis.     ROS otherwise unremarkable    PE: female in painful distress. appears nontoxic. SKIN: pink, no pallor. EXT: FROM. NEURO: AAO 3 no obvious deficits. HEENT: mucosa moist. EOMI. ABD: non distended, holding in position of comfort. Remainder of examination refused by patient.    Impression: pelvic pain, vaginal bleeding    Plan: IV labs imaging supportive care and reevaluation I personally evaluated the patient. I reviewed the Resident’s or Physician Assistant’s note (as assigned above), and agree with the findings and plan except as documented in my note.     36 female here for pelvic pain and vaginal bleeding. Clinical decisions discussed with PA and reviewed notes to date in place of bedside HPI. Patient is here for pelvic pain, states she is concerned for ectopic pregnancy as she had prior and her symptoms are similar. Voices her concerns about potential delay in ectopic diagnosis. LMP 10/4/21, had hernia surgery 10/8/21 and was pregnancy negative at that time.     ROS otherwise unremarkable    PE: female in painful distress. appears nontoxic. SKIN: pink, no pallor. EXT: FROM. NEURO: AAO 3 no obvious deficits. HEENT: mucosa moist. EOMI. ABD: non distended, holding in position of comfort. Remainder of examination refused by patient.    Impression: pelvic pain, vaginal bleeding    Plan: IV labs imaging supportive care and reevaluation

## 2021-10-18 NOTE — CONSULT NOTE ADULT - ASSESSMENT
Pt has been isolating to her room when not out for dinner.  She reports hearing voices telling her to hurt herself but says she is managing.  Using radio headphones and resting in bed.  Staff encouraged her to participate in unit activities as it has helped in the past, however, Pt declined.  Seroquel was increased this evening.  She took PRN Miralax for constipation.     37yo  @6w2d with pregnancy of unknown location, abnormal IUP vs complete  vs ectopic, currently clinically and hemodynamically stable.    -no acute GYN intervention  -repeat bhcg on 10/20 (script sent electronically)  -ectopic precautions given  -Dispo per ED    Dr. Villafuerte and Dr. Long aware

## 2021-10-18 NOTE — ED PROVIDER NOTE - NSFOLLOWUPCLINICS_GEN_ALL_ED_FT
[facility]  OB/GYN  [address] [place] [address]  Phone: [phone]  Fax:   Follow Up Time: 1-3 Days

## 2021-10-18 NOTE — CONSULT NOTE ADULT - SUBJECTIVE AND OBJECTIVE BOX
PGY2 Note  Chief Complaint: vaginal bleeding and pelvic pain     HPI: 35yo  @6w2d by LMP 21 presents to ED with new onset sharp cramping abdominal pain, worse on the right side starting today. Patient recently found out she was pregnant and has h/o ruptured ectopic pregnancy. Vaginal bleeding began around 1500 so she presented for evaluation. Bleeding has been moderate to heavy, patient required tissues in her underwear. Also endorses intermittent nausea. Reports dysuria, being treated for UTI. Denies fever, chills, vomiting. Denies chest pain, SOB. Patient had hernia repair on 10/8 with Dr. Rod. Unplanned, undesired pregnancy. Will be seeing Dr. Villafuerte for GYN care.    Ob/Gyn History:                   LMP - 10/4/21                  Cycle Length - q28d  Denies history of ovarian cysts, uterine fibroids, abnormal paps, or STIs    OBHx:  FT  x2, required D&C x2 after delivery for retained products of conception  Ruptured left ectopic in , s/p left salpingectomy    Denies the following: constitutional symptoms, visual symptoms, cardiovascular symptoms, respiratory symptoms, GI symptoms, musculoskeletal symptoms, skin symptoms, neurologic symptoms, hematologic symptoms, allergic symptoms, psychiatric symptoms  Except any pertinent positives listed.     PAST MEDICAL & SURGICAL HISTORY:  Asthma  Anxiety    Laparoscopic left salpingectomy  Hernia repair  History of hemorrhoidectomy    FAMILY HISTORY:  Mother/Father: DM, HTN    SOCIAL HISTORY: Denies cigarette use, alcohol use, or illicit drug use    Home Medications: none    Allergies: No Known Allergies or Intolerances    Vital Signs Last 24 Hrs  T(F): 97.8 (18 Oct 2021 18:00), Max: 97.8 (18 Oct 2021 18:00)  HR: 65 (18 Oct 2021 18:00) (65 - 67)  BP: 119/67 (18 Oct 2021 18:00) (119/67 - 142/73)  RR: 16 (18 Oct 2021 18:00) (16 - 18)  Height (cm): 160 (10-18-21 @ 16:50)    General Appearance - AAOx3, NAD  Heart - S1S2 regular rate and rhythm  Lung - CTA Bilaterally  Abdomen - Soft, mild tenderness to palpation in RLQ, otherwise nontender, nondistended, no rebound, no rigidity, no guarding, bowel sounds present    GYN/Pelvis:    Labia Majora - Normal  Labia Minora - Normal  Clitoris - Normal  Urethra - Normal  Vagina - 5-10cc dark red blood  Cervix - 0.5cm dilated, slow ooze dark red blood, no CMT    Uterus:  Size - Normal  Tenderness - None  Mass - None  Freely mobile    Adnexa:  Masses - None  Tenderness - None  Exam limited due to patient discomfort    Meds:   ondansetron Injectable 4 milliGRAM(s) IV Push once  sodium chloride 0.9% Bolus 1000 milliLiter(s) IV Bolus once    Height (cm): 160 (10-18-21 @ 16:50)    LABS:                        12.0   10.78 )-----------( 284      ( 18 Oct 2021 20:18 )             35.0     HCG Quantitative, Serum: 3330.0 mIU/mL (10-18-21 @ 20:18)    ABO RH Interpretation: B POS (10-18-21 @ 20:18)  Antibody Screen: NEG (10-18-21 @ 20:18)    10-18    140  |  105  |  13  ----------------------------<  98  4.5   |  22  |  0.8    Ca    9.4      18 Oct 2021 20:18    TPro  6.8  /  Alb  4.1  /  TBili  0.3  /  DBili  x   /  AST  18  /  ALT  8   /  AlkPhos  46  1018    PT/INR - ( 18 Oct 2021 20:18 )   PT: 14.80 sec;   INR: 1.29 ratio       PTT - ( 18 Oct 2021 20:18 )  PTT:31.9 sec  Urinalysis Basic - ( 18 Oct 2021 22:25 )    Color: Red / Appearance: Turbid / S.018 / pH: x  Gluc: x / Ketone: x  / Bili: x / Urobili: x   Blood: x / Protein: x / Nitrite: x   Leuk Esterase: x / RBC: x / WBC x   Sq Epi: x / Non Sq Epi: x / Bacteria: x    RADIOLOGY & ADDITIONAL STUDIES:  < from: US Transvaginal (10.18.21 @ 21:27) >  EXAM:  US TRANSVAGINAL          PROCEDURE DATE:  10/18/2021      INTERPRETATION:  CLINICAL HISTORY / REASON FOR EXAM: Vaginal bleeding. Beta hCG 3,330    CORRELATION: CT abdomen and pelvis from 2021    LMP: 2021    TECHNIQUE:  Endovaginal and transabdominal pelvic sonogram. Color and Spectral Doppler was performed.    FINDINGS:    Uterus: 9.2 x 5.7 x 4.8 cm. Within normal limits.  Endometrium: 11 mm. Hypoechoic collection within the endometrial canal measuring 1.4 x1.2 x 0.5 cm.    Right ovary: Not definitively visualized. The right fallopian tube is dilated.  Left ovary: 2.7 x 1.4 x 1.6 cm. Within normal limits. Normal arterial and venous waveforms.    Fluid: Trace fluid in the pelvis.    IMPRESSION:    Fluid within the pelvis and endometrial canal without definite visualization of gestational sac.    In the setting of elevated beta hCG, differential includes nonviable intrauterine pregnancy, completed  or unidentified ectopic pregnancy.    --- End of Report ---    < end of copied text >

## 2021-10-18 NOTE — ED PROVIDER NOTE - NS ED ROS FT
Review of Systems    Constitutional: (-) fever   Eyes/ENT: (-) vision changes  Cardiovascular: (-) chest pain, (-) syncope (-) palpitations  Respiratory: (-) cough, (-) shortness of breath  Gastrointestinal: (-) vomiting, (-) diarrhea (-)black/bloody stools (+) abdominal pain  Genitourinary:  (-) dysuria see hpi   Musculoskeletal: (-) neck pain, (-) back pain, (-) leg pain/swelling  Integumentary: (-) rash, (-) edema  Neurological: (-) headache, (-) confusion  Hematologic: (-) easy bruising

## 2021-10-19 ENCOUNTER — RESULT REVIEW (OUTPATIENT)
Age: 36
End: 2021-10-19

## 2021-10-19 ENCOUNTER — APPOINTMENT (OUTPATIENT)
Dept: SURGERY | Facility: CLINIC | Age: 36
End: 2021-10-19
Payer: COMMERCIAL

## 2021-10-19 PROBLEM — F41.9 ANXIETY DISORDER, UNSPECIFIED: Chronic | Status: ACTIVE | Noted: 2021-10-08

## 2021-10-19 PROBLEM — J45.909 UNSPECIFIED ASTHMA, UNCOMPLICATED: Chronic | Status: ACTIVE | Noted: 2021-10-08

## 2021-10-19 LAB
BACTERIA # UR AUTO: NEGATIVE — SIGNIFICANT CHANGE UP
BILIRUB UR-MCNC: NEGATIVE — SIGNIFICANT CHANGE UP
DIFF PNL FLD: ABNORMAL
EPI CELLS # UR: 6 /HPF — HIGH (ref 0–5)
GLUCOSE UR QL: NEGATIVE — SIGNIFICANT CHANGE UP
HYALINE CASTS # UR AUTO: 8 /LPF — HIGH (ref 0–7)
KETONES UR-MCNC: NEGATIVE — SIGNIFICANT CHANGE UP
LEUKOCYTE ESTERASE UR-ACNC: ABNORMAL
NITRITE UR-MCNC: NEGATIVE — SIGNIFICANT CHANGE UP
PH UR: 6.5 — SIGNIFICANT CHANGE UP (ref 5–8)
PROT UR-MCNC: NEGATIVE — SIGNIFICANT CHANGE UP
RBC CASTS # UR COMP ASSIST: >720 /HPF — HIGH (ref 0–4)
UROBILINOGEN FLD QL: SIGNIFICANT CHANGE UP
WBC UR QL: 6 /HPF — HIGH (ref 0–5)

## 2021-10-19 PROCEDURE — 99024 POSTOP FOLLOW-UP VISIT: CPT

## 2021-10-19 NOTE — CONSULT LETTER
[Dear  ___] : Dear  [unfilled], [Courtesy Letter:] : I had the pleasure of seeing your patient, [unfilled], in my office today. [Please see my note below.] : Please see my note below. [Consult Closing:] : Thank you very much for allowing me to participate in the care of this patient.  If you have any questions, please do not hesitate to contact me. [FreeTextEntry3] : Respectfully,\par \par Jim Reid M.D., FACS\par

## 2021-10-19 NOTE — ED ADULT NURSE NOTE - OBJECTIVE STATEMENT
Pt is c/o lower abdominal pain especially in her groin. pt is pregnant for few weeks now, and noticed bleeding this afternoon ,with cramps. Pt said she has hx ectopic pregnancy, and wants to R/O that, because the symptoms are similar.

## 2021-10-19 NOTE — ASSESSMENT
[FreeTextEntry1] : Aster underwent the repair of her incarcerated ventral hernia with mesh and umbilical hernia with mesh on October 8, 2021 under local with IV sedation without any problems or complications. Her wound is clean, dry and intact. There is no evidence of erythema, seroma formation or infection. She is tolerating a diet and having normal bowel movements. She denies any significant postoperative pain or discomfort at this time.\par \par However, she did present to the emergency room last night with pelvic pain and she was found to be pregnant. She has a history of ectopic pregnancy and had one of her fallopian tubes removed in the past. Her urine pregnancy done on the day of surgery on October 8, 2021 was negative.\par \par She was counseled and reassured. Aster was discharged from the office with no specific followup necessary, but she knows to avoid any heavy lifting or strenuous activity for the next several weeks. She was encouraged to continue to wear her abdominal binder for the better part of the next month.\par \par Of note, if laparoscopic GYN surgery is required in the future, she was instructed to informed the surgeon that the trocar should not be placed in the umbilical region due to a 6 cm round mesh in this area.

## 2021-10-20 ENCOUNTER — INPATIENT (INPATIENT)
Facility: HOSPITAL | Age: 36
LOS: 0 days | Discharge: HOME | End: 2021-10-21
Attending: OBSTETRICS & GYNECOLOGY | Admitting: OBSTETRICS & GYNECOLOGY
Payer: COMMERCIAL

## 2021-10-20 VITALS
HEIGHT: 63 IN | RESPIRATION RATE: 18 BRPM | OXYGEN SATURATION: 97 % | DIASTOLIC BLOOD PRESSURE: 72 MMHG | SYSTOLIC BLOOD PRESSURE: 138 MMHG | WEIGHT: 145.06 LBS | TEMPERATURE: 99 F | HEART RATE: 95 BPM

## 2021-10-20 DIAGNOSIS — Z98.890 OTHER SPECIFIED POSTPROCEDURAL STATES: Chronic | ICD-10-CM

## 2021-10-20 LAB
ALBUMIN SERPL ELPH-MCNC: 4 G/DL — SIGNIFICANT CHANGE UP (ref 3.5–5.2)
ALP SERPL-CCNC: 41 U/L — SIGNIFICANT CHANGE UP (ref 30–115)
ALT FLD-CCNC: 6 U/L — SIGNIFICANT CHANGE UP (ref 0–41)
ANION GAP SERPL CALC-SCNC: 13 MMOL/L — SIGNIFICANT CHANGE UP (ref 7–14)
APPEARANCE UR: CLEAR — SIGNIFICANT CHANGE UP
APTT BLD: 29.5 SEC — SIGNIFICANT CHANGE UP (ref 27–39.2)
AST SERPL-CCNC: 12 U/L — SIGNIFICANT CHANGE UP (ref 0–41)
BACTERIA # UR AUTO: NEGATIVE — SIGNIFICANT CHANGE UP
BASOPHILS # BLD AUTO: 0.04 K/UL — SIGNIFICANT CHANGE UP (ref 0–0.2)
BASOPHILS NFR BLD AUTO: 0.3 % — SIGNIFICANT CHANGE UP (ref 0–1)
BILIRUB SERPL-MCNC: 0.5 MG/DL — SIGNIFICANT CHANGE UP (ref 0.2–1.2)
BILIRUB UR-MCNC: NEGATIVE — SIGNIFICANT CHANGE UP
BLD GP AB SCN SERPL QL: SIGNIFICANT CHANGE UP
BUN SERPL-MCNC: 7 MG/DL — LOW (ref 10–20)
CALCIUM SERPL-MCNC: 8.6 MG/DL — SIGNIFICANT CHANGE UP (ref 8.5–10.1)
CHLORIDE SERPL-SCNC: 103 MMOL/L — SIGNIFICANT CHANGE UP (ref 98–110)
CO2 SERPL-SCNC: 21 MMOL/L — SIGNIFICANT CHANGE UP (ref 17–32)
COLOR SPEC: SIGNIFICANT CHANGE UP
CREAT SERPL-MCNC: 0.7 MG/DL — SIGNIFICANT CHANGE UP (ref 0.7–1.5)
CULTURE RESULTS: SIGNIFICANT CHANGE UP
DIFF PNL FLD: ABNORMAL
EOSINOPHIL # BLD AUTO: 0.29 K/UL — SIGNIFICANT CHANGE UP (ref 0–0.7)
EOSINOPHIL NFR BLD AUTO: 2.2 % — SIGNIFICANT CHANGE UP (ref 0–8)
EPI CELLS # UR: 3 /HPF — SIGNIFICANT CHANGE UP (ref 0–5)
GLUCOSE SERPL-MCNC: 76 MG/DL — SIGNIFICANT CHANGE UP (ref 70–99)
GLUCOSE UR QL: NEGATIVE — SIGNIFICANT CHANGE UP
HCG SERPL-ACNC: 3208 MIU/ML — HIGH
HCT VFR BLD CALC: 31.1 % — LOW (ref 37–47)
HGB BLD-MCNC: 10.4 G/DL — LOW (ref 12–16)
HYALINE CASTS # UR AUTO: 2 /LPF — SIGNIFICANT CHANGE UP (ref 0–7)
IMM GRANULOCYTES NFR BLD AUTO: 0.3 % — SIGNIFICANT CHANGE UP (ref 0.1–0.3)
INR BLD: 1.24 RATIO — SIGNIFICANT CHANGE UP (ref 0.65–1.3)
KETONES UR-MCNC: ABNORMAL
LEUKOCYTE ESTERASE UR-ACNC: NEGATIVE — SIGNIFICANT CHANGE UP
LIDOCAIN IGE QN: 27 U/L — SIGNIFICANT CHANGE UP (ref 7–60)
LYMPHOCYTES # BLD AUTO: 17.9 % — LOW (ref 20.5–51.1)
LYMPHOCYTES # BLD AUTO: 2.4 K/UL — SIGNIFICANT CHANGE UP (ref 1.2–3.4)
MCHC RBC-ENTMCNC: 31 PG — SIGNIFICANT CHANGE UP (ref 27–31)
MCHC RBC-ENTMCNC: 33.4 G/DL — SIGNIFICANT CHANGE UP (ref 32–37)
MCV RBC AUTO: 92.6 FL — SIGNIFICANT CHANGE UP (ref 81–99)
MONOCYTES # BLD AUTO: 0.75 K/UL — HIGH (ref 0.1–0.6)
MONOCYTES NFR BLD AUTO: 5.6 % — SIGNIFICANT CHANGE UP (ref 1.7–9.3)
NEUTROPHILS # BLD AUTO: 9.9 K/UL — HIGH (ref 1.4–6.5)
NEUTROPHILS NFR BLD AUTO: 73.7 % — SIGNIFICANT CHANGE UP (ref 42.2–75.2)
NITRITE UR-MCNC: NEGATIVE — SIGNIFICANT CHANGE UP
NRBC # BLD: 0 /100 WBCS — SIGNIFICANT CHANGE UP (ref 0–0)
PH UR: 6 — SIGNIFICANT CHANGE UP (ref 5–8)
PLATELET # BLD AUTO: 280 K/UL — SIGNIFICANT CHANGE UP (ref 130–400)
POTASSIUM SERPL-MCNC: 3.8 MMOL/L — SIGNIFICANT CHANGE UP (ref 3.5–5)
POTASSIUM SERPL-SCNC: 3.8 MMOL/L — SIGNIFICANT CHANGE UP (ref 3.5–5)
PROT SERPL-MCNC: 6.2 G/DL — SIGNIFICANT CHANGE UP (ref 6–8)
PROT UR-MCNC: SIGNIFICANT CHANGE UP
PROTHROM AB SERPL-ACNC: 14.2 SEC — HIGH (ref 9.95–12.87)
RBC # BLD: 3.36 M/UL — LOW (ref 4.2–5.4)
RBC # FLD: 12.1 % — SIGNIFICANT CHANGE UP (ref 11.5–14.5)
RBC CASTS # UR COMP ASSIST: 2 /HPF — SIGNIFICANT CHANGE UP (ref 0–4)
SARS-COV-2 RNA SPEC QL NAA+PROBE: SIGNIFICANT CHANGE UP
SODIUM SERPL-SCNC: 137 MMOL/L — SIGNIFICANT CHANGE UP (ref 135–146)
SP GR SPEC: 1.01 — SIGNIFICANT CHANGE UP (ref 1.01–1.03)
SPECIMEN SOURCE: SIGNIFICANT CHANGE UP
UROBILINOGEN FLD QL: SIGNIFICANT CHANGE UP
WBC # BLD: 13.42 K/UL — HIGH (ref 4.8–10.8)
WBC # FLD AUTO: 13.42 K/UL — HIGH (ref 4.8–10.8)
WBC UR QL: 2 /HPF — SIGNIFICANT CHANGE UP (ref 0–5)

## 2021-10-20 PROCEDURE — 99285 EMERGENCY DEPT VISIT HI MDM: CPT

## 2021-10-20 PROCEDURE — 88305 TISSUE EXAM BY PATHOLOGIST: CPT | Mod: 26

## 2021-10-20 PROCEDURE — 88304 TISSUE EXAM BY PATHOLOGIST: CPT | Mod: 26

## 2021-10-20 PROCEDURE — 76856 US EXAM PELVIC COMPLETE: CPT | Mod: 26

## 2021-10-20 RX ORDER — MORPHINE SULFATE 50 MG/1
2 CAPSULE, EXTENDED RELEASE ORAL ONCE
Refills: 0 | Status: DISCONTINUED | OUTPATIENT
Start: 2021-10-20 | End: 2021-10-20

## 2021-10-20 RX ORDER — ONDANSETRON 8 MG/1
4 TABLET, FILM COATED ORAL ONCE
Refills: 0 | Status: COMPLETED | OUTPATIENT
Start: 2021-10-20 | End: 2021-10-20

## 2021-10-20 RX ORDER — HYDROMORPHONE HYDROCHLORIDE 2 MG/ML
1 INJECTION INTRAMUSCULAR; INTRAVENOUS; SUBCUTANEOUS ONCE
Refills: 0 | Status: DISCONTINUED | OUTPATIENT
Start: 2021-10-20 | End: 2021-10-20

## 2021-10-20 RX ORDER — SODIUM CHLORIDE 9 MG/ML
1000 INJECTION INTRAMUSCULAR; INTRAVENOUS; SUBCUTANEOUS ONCE
Refills: 0 | Status: COMPLETED | OUTPATIENT
Start: 2021-10-20 | End: 2021-10-20

## 2021-10-20 RX ORDER — SODIUM CHLORIDE 9 MG/ML
1000 INJECTION, SOLUTION INTRAVENOUS
Refills: 0 | Status: DISCONTINUED | OUTPATIENT
Start: 2021-10-20 | End: 2021-10-21

## 2021-10-20 RX ADMIN — MORPHINE SULFATE 2 MILLIGRAM(S): 50 CAPSULE, EXTENDED RELEASE ORAL at 17:26

## 2021-10-20 RX ADMIN — MORPHINE SULFATE 2 MILLIGRAM(S): 50 CAPSULE, EXTENDED RELEASE ORAL at 18:37

## 2021-10-20 RX ADMIN — SODIUM CHLORIDE 1000 MILLILITER(S): 9 INJECTION INTRAMUSCULAR; INTRAVENOUS; SUBCUTANEOUS at 16:27

## 2021-10-20 RX ADMIN — MORPHINE SULFATE 2 MILLIGRAM(S): 50 CAPSULE, EXTENDED RELEASE ORAL at 16:27

## 2021-10-20 RX ADMIN — SODIUM CHLORIDE 1000 MILLILITER(S): 9 INJECTION INTRAMUSCULAR; INTRAVENOUS; SUBCUTANEOUS at 15:33

## 2021-10-20 RX ADMIN — HYDROMORPHONE HYDROCHLORIDE 1 MILLIGRAM(S): 2 INJECTION INTRAMUSCULAR; INTRAVENOUS; SUBCUTANEOUS at 20:09

## 2021-10-20 RX ADMIN — SODIUM CHLORIDE 125 MILLILITER(S): 9 INJECTION, SOLUTION INTRAVENOUS at 18:37

## 2021-10-20 RX ADMIN — ONDANSETRON 4 MILLIGRAM(S): 8 TABLET, FILM COATED ORAL at 15:31

## 2021-10-20 RX ADMIN — MORPHINE SULFATE 2 MILLIGRAM(S): 50 CAPSULE, EXTENDED RELEASE ORAL at 15:33

## 2021-10-20 NOTE — ED ADULT TRIAGE NOTE - CHIEF COMPLAINT QUOTE
pt ambulated to triage with complaint Dr Villafuerte sent in for and ultra sound for a miss carriage or possible E-topic pregnancy

## 2021-10-20 NOTE — H&P ADULT - ATTENDING COMMENTS
IMP: Ruptured Right Ectopic Pregnancy    Plan: Admit for OR - Suction D+C, Laproscopic Right Salpingectomy, possible Right Salpingoophrectomy, possible open - Consent Obtained - R/B/A/P amparo pt, On Call to OR

## 2021-10-20 NOTE — ED PROVIDER NOTE - OBJECTIVE STATEMENT
(prior L ectopic s/p salpingectomy) @6w4d GA by LMP 21 presents to ED for 2nd time this week. first visit was vaginal bleeding and presents today reporting outpt US with GYN today revealed right ectopic and IUP at approx 9 weeks. pt is also s/p ventral hernia repair on 10/8 with Dr. Reid. pain is sharp, nonradiating, moderate. denies exacerbating or relieving factors. Denies fever/chill/HA/dizziness/chest pain/palpitation/sob/n/v/d/ black stool/bloody stool/urinary sxs

## 2021-10-20 NOTE — PRE-ANESTHESIA EVALUATION ADULT - NSANTHOSAYNRD_GEN_A_CORE
No. WYATT screening performed.  STOP BANG Legend: 0-2 = LOW Risk; 3-4 = INTERMEDIATE Risk; 5-8 = HIGH Risk

## 2021-10-20 NOTE — ED PROVIDER NOTE - ATTENDING CONTRIBUTION TO CARE
35 yo f hx ectopic, , s/p ventral hernia repair, pt recently seen for bleeding in first trimester  pt had bedside sono at [facility]- pt found to heterotopic pregnancy and sent pt in. + R suprapubic pain.   pt has seen [facility] before for previous ectopic    vss  gen- NAD, aaox3  card-rrr  lungs-ctab, no wheezing or rhonchi  abd-suprapubic/R pelvic tenderness, no guarding or rebound  neuro- full str/sensation, cn ii-xii grossly intact, normal coordination     will get labs, hcg, pelvic sono, T&S, preops

## 2021-10-20 NOTE — PRE-ANESTHESIA EVALUATION ADULT - NSPROPOSEDPROCEDFT_GEN_ALL_CORE
Laparascopy Griseofulvin Counseling:  I discussed with the patient the risks of griseofulvin including but not limited to photosensitivity, cytopenia, liver damage, nausea/vomiting and severe allergy.  The patient understands that this medication is best absorbed when taken with a fatty meal (e.g., ice cream or french fries).

## 2021-10-20 NOTE — H&P ADULT - ASSESSMENT
A/P: 35yo  @6w4d, with abd pain, heterotopic pregnancy with right ruptured ectopic, currently clinically and hemodynamically stable  - admit to GYN  - NPO/IVF  - pain management PRN  - monitor vitals  - on call to OR for right salpingectomy, possible right salpingo-oophorectomy, possible D&C for missed     Discussed with Dr. Villafuerte A/P: 35yo  @6w4d, with abd pain, R/O heterotopic pregnancy with right ruptured ectopic, currently clinically and hemodynamically stable  - admit to GYN  - NPO/IVF  - pain management PRN  - monitor vitals  - on call to OR for right salpingectomy, possible right salpingo-oophorectomy, possible D&C for missed     Discussed with Dr. Villafuerte

## 2021-10-20 NOTE — H&P ADULT - NSHPPHYSICALEXAM_GEN_ALL_CORE
Vital Signs Last 24 Hrs  T(C): 37 (20 Oct 2021 14:26), Max: 37 (20 Oct 2021 14:26)  T(F): 98.6 (20 Oct 2021 14:26), Max: 98.6 (20 Oct 2021 14:26)  HR: 95 (20 Oct 2021 14:26) (95 - 95)  BP: 138/72 (20 Oct 2021 14:26) (138/72 - 138/72)  RR: 18 (20 Oct 2021 14:26) (18 - 18)  SpO2: 97% (20 Oct 2021 14:26) (97% - 97%)    General Appearance - AAOx3, NAD  Heart - S1S2 regular rate and rhythm  Lung - CTA Bilaterally  Abdomen - Soft, tenderness to palpation in RLQ, otherwise nontender, nondistended, no rebound, no rigidity, no guarding, bowel sounds present  GYN/Pelvis - deferred

## 2021-10-20 NOTE — ED ADULT NURSE NOTE - NS ED NOTE ABUSE RESPONSE YN
Cardiac defibrillator in place  2017  Med[employer]onic Visa AF MRI VR Surescan ICD   Model# [id number], SN# [id number]
Yes

## 2021-10-20 NOTE — ED PROVIDER NOTE - NS ED ROS FT
Constitutional: no fever, chills, no recent weight loss, change in appetite or malaise  Eyes: no redness/discharge/pain/vision changes  ENT: no rhinorrhea/ear pain/sore throat  Cardiac: No chest pain, SOB or edema.  Respiratory: No cough or respiratory distress  GI: No nausea, vomiting, diarrhea  : No dysuria, frequency, urgency or hematuria  MS: no pain to back or extremities, no loss of ROM, no weakness  Neuro: No headache or weakness. No LOC.  Skin: No skin rash.  Endocrine: No history of thyroid disease or diabetes.  Except as documented in the HPI, all other systems are negative.

## 2021-10-20 NOTE — ED PROVIDER NOTE - PHYSICAL EXAMINATION
CONSTITUTIONAL: Well-appearing; well-nourished; in no apparent distress.   CARDIOVASCULAR: Normal S1, S2; no murmurs, rubs, or gallops.   RESPIRATORY: Normal chest excursion with respiration; breath sounds clear and equal bilaterally; no wheezes, rhonchi, or rales.  GI/: ttp lower abd, R>L; Normal bowel sounds; non-distended  SKIN: Normal for age and race; warm; dry; good turgor; no apparent lesions or exudate.   NEURO/PSYCH: A & O x 4; grossly unremarkable. mood and manner are appropriate.

## 2021-10-21 ENCOUNTER — TRANSCRIPTION ENCOUNTER (OUTPATIENT)
Age: 36
End: 2021-10-21

## 2021-10-21 VITALS
HEART RATE: 88 BPM | TEMPERATURE: 99 F | RESPIRATION RATE: 18 BRPM | SYSTOLIC BLOOD PRESSURE: 97 MMHG | OXYGEN SATURATION: 98 % | DIASTOLIC BLOOD PRESSURE: 52 MMHG

## 2021-10-21 LAB — HCG SERPL-MCNC: 3959 MIU/ML

## 2021-10-21 RX ORDER — SIMETHICONE 80 MG/1
80 TABLET, CHEWABLE ORAL EVERY 6 HOURS
Refills: 0 | Status: DISCONTINUED | OUTPATIENT
Start: 2021-10-21 | End: 2021-10-21

## 2021-10-21 RX ORDER — OXYCODONE HYDROCHLORIDE 5 MG/1
5 TABLET ORAL ONCE
Refills: 0 | Status: DISCONTINUED | OUTPATIENT
Start: 2021-10-21 | End: 2021-10-21

## 2021-10-21 RX ORDER — ONDANSETRON 8 MG/1
4 TABLET, FILM COATED ORAL EVERY 6 HOURS
Refills: 0 | Status: DISCONTINUED | OUTPATIENT
Start: 2021-10-21 | End: 2021-10-21

## 2021-10-21 RX ORDER — IBUPROFEN 200 MG
600 TABLET ORAL EVERY 6 HOURS
Refills: 0 | Status: DISCONTINUED | OUTPATIENT
Start: 2021-10-21 | End: 2021-10-21

## 2021-10-21 RX ORDER — SIMETHICONE 80 MG/1
1 TABLET, CHEWABLE ORAL
Qty: 56 | Refills: 0
Start: 2021-10-21 | End: 2021-11-03

## 2021-10-21 RX ORDER — SENNA PLUS 8.6 MG/1
2 TABLET ORAL ONCE
Refills: 0 | Status: COMPLETED | OUTPATIENT
Start: 2021-10-21 | End: 2021-10-21

## 2021-10-21 RX ORDER — INFLUENZA VIRUS VACCINE 15; 15; 15; 15 UG/.5ML; UG/.5ML; UG/.5ML; UG/.5ML
0.5 SUSPENSION INTRAMUSCULAR ONCE
Refills: 0 | Status: DISCONTINUED | OUTPATIENT
Start: 2021-10-21 | End: 2021-10-21

## 2021-10-21 RX ORDER — IBUPROFEN 200 MG
600 TABLET ORAL ONCE
Refills: 0 | Status: COMPLETED | OUTPATIENT
Start: 2021-10-21 | End: 2021-10-21

## 2021-10-21 RX ORDER — ACETAMINOPHEN 500 MG
2 TABLET ORAL
Qty: 112 | Refills: 0
Start: 2021-10-21 | End: 2021-11-03

## 2021-10-21 RX ORDER — KETOROLAC TROMETHAMINE 30 MG/ML
30 SYRINGE (ML) INJECTION ONCE
Refills: 0 | Status: DISCONTINUED | OUTPATIENT
Start: 2021-10-21 | End: 2021-10-21

## 2021-10-21 RX ORDER — ACETAMINOPHEN 500 MG
1000 TABLET ORAL ONCE
Refills: 0 | Status: COMPLETED | OUTPATIENT
Start: 2021-10-21 | End: 2021-10-21

## 2021-10-21 RX ORDER — SENNA PLUS 8.6 MG/1
2 TABLET ORAL AT BEDTIME
Refills: 0 | Status: DISCONTINUED | OUTPATIENT
Start: 2021-10-21 | End: 2021-10-21

## 2021-10-21 RX ORDER — DIPHENHYDRAMINE HCL 50 MG
25 CAPSULE ORAL ONCE
Refills: 0 | Status: COMPLETED | OUTPATIENT
Start: 2021-10-21 | End: 2021-10-21

## 2021-10-21 RX ORDER — SODIUM CHLORIDE 9 MG/ML
1000 INJECTION, SOLUTION INTRAVENOUS
Refills: 0 | Status: DISCONTINUED | OUTPATIENT
Start: 2021-10-21 | End: 2021-10-21

## 2021-10-21 RX ORDER — HYDROMORPHONE HYDROCHLORIDE 2 MG/ML
0.5 INJECTION INTRAMUSCULAR; INTRAVENOUS; SUBCUTANEOUS
Refills: 0 | Status: DISCONTINUED | OUTPATIENT
Start: 2021-10-21 | End: 2021-10-21

## 2021-10-21 RX ORDER — ACETAMINOPHEN 500 MG
650 TABLET ORAL EVERY 6 HOURS
Refills: 0 | Status: DISCONTINUED | OUTPATIENT
Start: 2021-10-21 | End: 2021-10-21

## 2021-10-21 RX ORDER — IBUPROFEN 200 MG
1 TABLET ORAL
Qty: 56 | Refills: 0
Start: 2021-10-21 | End: 2021-11-03

## 2021-10-21 RX ORDER — SIMETHICONE 80 MG/1
80 TABLET, CHEWABLE ORAL ONCE
Refills: 0 | Status: COMPLETED | OUTPATIENT
Start: 2021-10-21 | End: 2021-10-21

## 2021-10-21 RX ORDER — DOCUSATE SODIUM 100 MG
1 CAPSULE ORAL
Qty: 28 | Refills: 0
Start: 2021-10-21 | End: 2021-11-03

## 2021-10-21 RX ORDER — OXYCODONE HYDROCHLORIDE 5 MG/1
1 TABLET ORAL
Qty: 12 | Refills: 0
Start: 2021-10-21 | End: 2021-10-23

## 2021-10-21 RX ORDER — ACETAMINOPHEN 500 MG
1000 TABLET ORAL ONCE
Refills: 0 | Status: DISCONTINUED | OUTPATIENT
Start: 2021-10-21 | End: 2021-10-21

## 2021-10-21 RX ADMIN — Medication 1000 MILLIGRAM(S): at 01:50

## 2021-10-21 RX ADMIN — OXYCODONE HYDROCHLORIDE 5 MILLIGRAM(S): 5 TABLET ORAL at 12:46

## 2021-10-21 RX ADMIN — Medication 400 MILLIGRAM(S): at 01:20

## 2021-10-21 RX ADMIN — SENNA PLUS 2 TABLET(S): 8.6 TABLET ORAL at 12:50

## 2021-10-21 RX ADMIN — SIMETHICONE 80 MILLIGRAM(S): 80 TABLET, CHEWABLE ORAL at 11:30

## 2021-10-21 RX ADMIN — Medication 650 MILLIGRAM(S): at 05:21

## 2021-10-21 RX ADMIN — Medication 25 MILLIGRAM(S): at 06:30

## 2021-10-21 RX ADMIN — Medication 30 MILLIGRAM(S): at 01:20

## 2021-10-21 RX ADMIN — ONDANSETRON 4 MILLIGRAM(S): 8 TABLET, FILM COATED ORAL at 02:03

## 2021-10-21 RX ADMIN — SIMETHICONE 80 MILLIGRAM(S): 80 TABLET, CHEWABLE ORAL at 05:22

## 2021-10-21 RX ADMIN — Medication 650 MILLIGRAM(S): at 11:30

## 2021-10-21 RX ADMIN — HYDROMORPHONE HYDROCHLORIDE 0.5 MILLIGRAM(S): 2 INJECTION INTRAMUSCULAR; INTRAVENOUS; SUBCUTANEOUS at 01:59

## 2021-10-21 RX ADMIN — HYDROMORPHONE HYDROCHLORIDE 0.5 MILLIGRAM(S): 2 INJECTION INTRAMUSCULAR; INTRAVENOUS; SUBCUTANEOUS at 02:29

## 2021-10-21 RX ADMIN — SIMETHICONE 80 MILLIGRAM(S): 80 TABLET, CHEWABLE ORAL at 07:58

## 2021-10-21 RX ADMIN — Medication 600 MILLIGRAM(S): at 05:21

## 2021-10-21 RX ADMIN — Medication 600 MILLIGRAM(S): at 07:58

## 2021-10-21 RX ADMIN — Medication 30 MILLIGRAM(S): at 01:50

## 2021-10-21 RX ADMIN — Medication 600 MILLIGRAM(S): at 11:30

## 2021-10-21 NOTE — BRIEF OPERATIVE NOTE - NSICDXBRIEFPOSTOP_GEN_ALL_CORE_FT
POST-OP DIAGNOSIS:  Ruptured right tubal ectopic pregnancy causing hemoperitoneum 21-Oct-2021 01:13:33  Juliann Long

## 2021-10-21 NOTE — BRIEF OPERATIVE NOTE - OPERATION/FINDINGS
small amount of tissue on d&c  1000cc hemoperitoneum, ruptured right ectopic pregnancy  surgically absent left tube  normal uterus and ovaries bilaterally

## 2021-10-21 NOTE — PATIENT PROFILE ADULT - FALLEN IN THE PAST
Your current Orthopaedic problem we are working together to treat is:  Right knee.      It is recommended you schedule a follow-up appointment with Sebastián Anderson MD in 4 months.  Office hours are 8:00 am to 5:00 pm Monday through Friday.  If it is urgent that you speak with someone outside of these hours, our [facility] Call Center will be able to assist you.  You can reach the office by calling the [facility] scheduling line at [phone].  We do highly recommend [facility], if you do not already have this.  You can request access via the internet or by simply talking with a  at any of the clinics.  [email or web address].    Thank you for choosing [facility] as your Orthopaedic provider!     no

## 2021-10-21 NOTE — PROGRESS NOTE ADULT - SUBJECTIVE AND OBJECTIVE BOX
PGY 3 Note  Patient seen and evaluated at bedside, doing well, no acute events overnight. Reports mild abdominal pain and anxiety, denies fever, chills, CP, SOB, N/V, severe abd pain, VB, urinary symptoms or LE pain/swelling. Tolerating regular diet, ambulating, voiding. Denies flatus or BM    Physical exam:    Vital Signs Last 24 Hrs  T(F): 97.7 (21 Oct 2021 03:00), Max: 98.6 (20 Oct 2021 14:26)  HR: 92 (21 Oct 2021 03:00) (83 - 95)  BP: 116/55 (21 Oct 2021 03:00) (103/58 - 138/72)  RR: 18 (21 Oct 2021 03:00) (17 - 22)  SpO2: 100% (21 Oct 2021 02:30) (97% - 100%)    Gen: alert, oriented  CVS: RRR  Lungs: CTAB  Abdomen: Soft, nontender, non distended. No rebound, rigidity or guarding. Dressings in place over laparoscopic incisions., no saturation.  Perineum: no active bleeding.   Ext: No calf tenderness    Diet: regular    MEDICATIONS  (STANDING):  acetaminophen   Tablet .. 650 milliGRAM(s) Oral every 6 hours  acetaminophen  IVPB .. 1000 milliGRAM(s) IV Intermittent once  diphenhydrAMINE 25 milliGRAM(s) Oral once  ibuprofen  Tablet. 600 milliGRAM(s) Oral every 6 hours  influenza   Vaccine 0.5 milliLiter(s) IntraMuscular once  lactated ringers. 1000 milliLiter(s) (125 mL/Hr) IV Continuous <Continuous>  senna 2 Tablet(s) Oral at bedtime  simethicone 80 milliGRAM(s) Chew every 6 hours    MEDICATIONS  (PRN):  ondansetron Injectable 4 milliGRAM(s) IV Push every 6 hours PRN Nausea and/or Vomiting      LABS:                        10.4   13.42 )-----------( 280      ( 20 Oct 2021 19:44 )             31.1     Antibody Screen: NEG (10-20 @ 18:08)    10-20-21 @ 19:44      137  |  103  |  7<L>  ----------------------------<  76  3.8   |  21  |  0.7    10-18-21 @ 20:18      140  |  105  |  13  ----------------------------<  98  4.5   |  22  |  0.8        Ca    8.6      20 Oct 2021 19:44  Ca    9.4      18 Oct 2021 20:18    TPro  6.2  /  Alb  4.0  /  TBili  0.5  /  DBili  x   /  AST  12  /  ALT  6   /  AlkPhos  41  10-20-21 @ 19:44  TPro  6.8  /  Alb  4.1  /  TBili  0.3  /  DBili  x   /  AST  18  /  ALT  8   /  AlkPhos  46  10-18-21 @ 20:18          Culture - Urine (collected 10-18-21 @ 22:25)  Source: Clean Catch Clean Catch (Midstream)  Final Report (10-20-21 @ 12:22):    <10,000 CFU/mL Normal Urogenital Ophelia

## 2021-10-21 NOTE — ASU DISCHARGE PLAN (ADULT/PEDIATRIC) - CARE PROVIDER_API CALL
Jasper Villafuerte)  Obstetrics and Gynecology  [address] [place] [address]  Phone: [phone]  Fax: [phone]  Follow Up Time: 2 weeks

## 2021-10-21 NOTE — PROGRESS NOTE ADULT - ASSESSMENT
A/P: 35yo P2, s/p laparoscopic right salpingectomy, evacuation of hemoperitoneum, D&C, for ruptured ectopic pregnancy, EBL 1100cc, recovering well  - continue routine postop care  - pain management PRN  - monitor vitals and bleeding  - regular diet  - PO hydration and ambulation encouraged  - anticipate discharge home    Dr. Villafuerte to be made aware.

## 2021-10-21 NOTE — DISCHARGE NOTE NURSING/CASE MANAGEMENT/SOCIAL WORK - PATIENT PORTAL LINK FT
You can access the FollowMyHealth Patient Portal offered by [facility] by registering at the following website: [email or web address]. By joining [facility]’s FollowMyHealth portal, you will also be able to view your health information using other applications (apps) compatible with our system.

## 2021-10-21 NOTE — CHART NOTE - NSCHARTNOTEFT_GEN_A_CORE
ANESTHESIA to PACU NOTE      ____ Intubated  TV:______       Rate: ______      FiO2: ______    __x__ Patent Airway    __x__ Full return of protective reflexes    ____ Full recovery from anesthesia / sedation to baseline status    Vitals:  HR 85  /53  RR 15  O2sat. 100%  Temp: 36.3C      Mental Status:  __x__ Awake   __x___ Alert   _____ Drowsy   _____ Sedated    Nausea/Vomiting: ____ Yes, See Post - Op Orders      __x__ No    Pain Scale (0-10): _____    Treatment: x____ None    ____ See Post - Op/PCA Orders    Post - Operative Fluids:   ____ Oral   _x___ See Post - Op Orders    Plan:  Discharge to:   _x___Home       _____Floor      _____Critical Care    _____ Other:_________________    Comments: s/p general anesthesia with ETT. No anesthesia complications. Pt's condition is stable in PACU. Full report is given to PACU RN.

## 2021-10-21 NOTE — DISCHARGE NOTE PROVIDER - NSDCMRMEDTOKEN_GEN_ALL_CORE_FT
Colace 100 mg oral capsule: 1 cap(s) orally 2 times a day   ibuprofen 600 mg oral tablet: 1 tab(s) orally every 6 hours   simethicone 80 mg oral tablet, chewable: 1 tab(s) chewed every 6 hours   Tylenol 325 mg oral tablet: 2 tab(s) orally every 6 hours

## 2021-10-21 NOTE — DISCHARGE NOTE PROVIDER - HOSPITAL COURSE
Patient under uncomplicated right salpingectomy, evacuation of hemoperitoneum for ruptured ectopic pregnancy, was discharged on POD#1.

## 2021-10-21 NOTE — BRIEF OPERATIVE NOTE - NSICDXBRIEFPREOP_GEN_ALL_CORE_FT
PRE-OP DIAGNOSIS:  Ruptured right tubal ectopic pregnancy causing hemoperitoneum 21-Oct-2021 01:13:23  Juliann Long

## 2021-10-21 NOTE — DISCHARGE NOTE PROVIDER - CARE PROVIDER_API CALL
Jasper Villafuerte)  Obstetrics and Gynecology  [address] [place] [address]  Phone: [phone]  Fax: [phone]  Follow Up Time: 2 weeks

## 2021-10-21 NOTE — DISCHARGE NOTE PROVIDER - NSDCFUADDINST_GEN_ALL_CORE_FT
Nothing in the vagina for two weeks (no sex, no tampons, no douching). No tub baths or pools, may shower.

## 2021-10-21 NOTE — DISCHARGE NOTE PROVIDER - NSDCCPTREATMENT_GEN_ALL_CORE_FT
PRINCIPAL PROCEDURE  Procedure: Right salpingectomy for ectopic pregnancy  Findings and Treatment:

## 2021-10-21 NOTE — BRIEF OPERATIVE NOTE - NSICDXBRIEFPROCEDURE_GEN_ALL_CORE_FT
PROCEDURES:  D&C (dilatation and curettage, scraping of uterus) 21-Oct-2021 01:12:18  Juliann Long  Right salpingectomy for ectopic pregnancy 21-Oct-2021 01:12:54  Juliann Long  Diagnostic laparoscopy 21-Oct-2021 01:13:08  Juliann Long

## 2021-10-22 ENCOUNTER — EMERGENCY (EMERGENCY)
Facility: HOSPITAL | Age: 36
LOS: 0 days | Discharge: HOME | End: 2021-10-22
Attending: EMERGENCY MEDICINE | Admitting: EMERGENCY MEDICINE
Payer: COMMERCIAL

## 2021-10-22 VITALS
RESPIRATION RATE: 18 BRPM | WEIGHT: 143.96 LBS | DIASTOLIC BLOOD PRESSURE: 64 MMHG | HEART RATE: 93 BPM | TEMPERATURE: 99 F | HEIGHT: 63 IN | SYSTOLIC BLOOD PRESSURE: 120 MMHG | OXYGEN SATURATION: 99 %

## 2021-10-22 DIAGNOSIS — M79.89 OTHER SPECIFIED SOFT TISSUE DISORDERS: ICD-10-CM

## 2021-10-22 DIAGNOSIS — M79.661 PAIN IN RIGHT LOWER LEG: ICD-10-CM

## 2021-10-22 DIAGNOSIS — N93.9 ABNORMAL UTERINE AND VAGINAL BLEEDING, UNSPECIFIED: ICD-10-CM

## 2021-10-22 DIAGNOSIS — Z98.890 OTHER SPECIFIED POSTPROCEDURAL STATES: Chronic | ICD-10-CM

## 2021-10-22 DIAGNOSIS — G89.18 OTHER ACUTE POSTPROCEDURAL PAIN: ICD-10-CM

## 2021-10-22 DIAGNOSIS — R10.9 UNSPECIFIED ABDOMINAL PAIN: ICD-10-CM

## 2021-10-22 LAB
APPEARANCE UR: CLEAR — SIGNIFICANT CHANGE UP
BILIRUB UR-MCNC: NEGATIVE — SIGNIFICANT CHANGE UP
COLOR SPEC: COLORLESS — SIGNIFICANT CHANGE UP
CULTURE RESULTS: NO GROWTH — SIGNIFICANT CHANGE UP
DIFF PNL FLD: NEGATIVE — SIGNIFICANT CHANGE UP
GLUCOSE UR QL: NEGATIVE — SIGNIFICANT CHANGE UP
KETONES UR-MCNC: NEGATIVE — SIGNIFICANT CHANGE UP
LEUKOCYTE ESTERASE UR-ACNC: NEGATIVE — SIGNIFICANT CHANGE UP
NITRITE UR-MCNC: NEGATIVE — SIGNIFICANT CHANGE UP
PH UR: 6 — SIGNIFICANT CHANGE UP (ref 5–8)
PROT UR-MCNC: NEGATIVE — SIGNIFICANT CHANGE UP
SP GR SPEC: 1.01 — SIGNIFICANT CHANGE UP (ref 1.01–1.03)
SPECIMEN SOURCE: SIGNIFICANT CHANGE UP
UROBILINOGEN FLD QL: SIGNIFICANT CHANGE UP

## 2021-10-22 PROCEDURE — 99285 EMERGENCY DEPT VISIT HI MDM: CPT

## 2021-10-22 PROCEDURE — 93970 EXTREMITY STUDY: CPT | Mod: 26

## 2021-10-22 PROCEDURE — 76770 US EXAM ABDO BACK WALL COMP: CPT | Mod: 26

## 2021-10-22 RX ORDER — ACETAMINOPHEN 500 MG
650 TABLET ORAL ONCE
Refills: 0 | Status: DISCONTINUED | OUTPATIENT
Start: 2021-10-22 | End: 2021-10-22

## 2021-10-22 NOTE — ED PROVIDER NOTE - PROGRESS NOTE DETAILS
AH - pt refusing labs, meds; Dr Villafuerte aware of pt, pending DVT US AH - DVT US negative, OBGYN consulted via Teams, will see pt, Noy already aware Patient not in ED, Dr Johnson called her and she already left ED prior to official discharge.  Renal sono negative, lower extremity duplex negative ( preliminary read), advised to follow up with her docotr in 1 week for repeat duplex.

## 2021-10-22 NOTE — ED PROVIDER NOTE - CLINICAL SUMMARY MEDICAL DECISION MAKING FREE TEXT BOX
35 yo female PM clotting disorder s/p recent abdominal hernia repair ( early this month), s/p rt salpingectomy and D&C for ectopic pregnancy 2 days ago c/o rt lower leg swelling since discharge as well as rt flank pain for a few days.  Reports mild vaginal bleeding since her surgery.  She is requesting renal sono in addition to lower extremity duplex.  Denies fever or chills, no CP or SOB, no paresthesias or  focal weakness.  Well-appearing well-nourished female in NAD, head AT/NC, PERRL, pink conjunctivae,  mmm, nml oropharynx,  supple neck without midline spine ttp, nml work of breathing, lungs CTA b/l, equal air entry,  speaking full sentences, RRR, well-perfused extremities, distal pulses intact, abdomen soft, ND, + ttp around the surgical incision sites, no signs of infection/drainage,  BS present in all quadrants, no midline spine or CVA ttp, mild b/l non-pitting ankle leg edema without unilateral calf ttp, A&Ox3, no focal neuro deficits, nml mood and affect.  Duplex renal sono, labs, OB-GYN consult.

## 2021-10-22 NOTE — ED ADULT TRIAGE NOTE - CHIEF COMPLAINT QUOTE
pt c/o lower calf pain /swelling and right sided flank pain x 2 days.  pt had recent sx on 10/20 for d&C and hx of DVT. denies any shortness of breath .

## 2021-10-22 NOTE — ED PROVIDER NOTE - ATTENDING CONTRIBUTION TO CARE
37 yo female PM clotting disorder s/p recent abdominal hernia repair ( early this month), s/p rt salpingectomy and D&C for ectopic pregnancy 2 days ago c/o rt lower leg swelling since discharge as well as rt flank pain for a few days.  Reports mild vaginal bleeding since her surgery.  She is requesting renal sono in addition to lower extremity duplex.  Denies fever or chills, no CP or SOB, no paresthesias or  focal weakness.  Well-appearing well-nourished female in NAD, head AT/NC, PERRL, pink conjunctivae,  mmm, nml oropharynx,  supple neck without midline spine ttp, nml work of breathing, lungs CTA b/l, equal air entry,  speaking full sentences, RRR, well-perfused extremities, distal pulses intact, abdomen soft, ND, + ttp around the surgical incision sites, no signs of infection/drainage,  BS present in all quadrants, no midline spine or CVA ttp, mild b/l non-pitting ankle leg edema without unilateral calf ttp, A&Ox3, no focal neuro deficits, nml mood and affect.  Duplex renal sono, labs, OB-GYN consult.

## 2021-10-22 NOTE — ED PROVIDER NOTE - OBJECTIVE STATEMENT
37 yo F with PMH recent ectopic pregnancy s/p salpingectomy and D&C, hernia repair 1 mo ago, MTHFR mutation who presents with RLE swelling and pain.  Worsening since discharge.  Also endorses R flank pain.  Pt spoke to Dr Villafuerte and advised she come to ER.  Pt denies fevers, chills, headache, dizziness, SOB, chest pain, focal weakness.

## 2021-10-22 NOTE — ED PROVIDER NOTE - PHYSICAL EXAMINATION
CONSTITUTIONAL: Well-developed; well-nourished; in no acute distress, afebrile  SKIN: Warm, dry  EYES: No conjunctival injection. EOMI  ENT: No nasal discharge; oropharynx nonerythematous; airway clear  CARD:  Regular rate and rhythm  RESP: CTAB; No wheezes, crackles, rales or rhonchi  ABD: Soft non distended, mild ttp to surgical incision sites, + R flank ttp, no cva tenderness;  No guarding or rebound tenderness  EXT: Normal ROM.  No clubbing or cyanosis.  very mild swelling to RLE, mild calf tenderness, non pitting, DP pulses 2+ b/l, sensation in tact, no signs of ischemia  NEURO: A&O x3, grossly unremarkable, no focal deficits  PSYCH: Cooperative, appropriate  *Chaperone MD Delgado was used during the encounter. A professional environment was maintained and explained to patient

## 2021-10-22 NOTE — ED PROVIDER NOTE - NS ED ROS FT
Review of Systems:  CONSTITUTIONAL: No fever, No diaphoresis  SKIN: No rash  HEMATOLOGIC: No abnormal bleeding or bruising  EYES: No eye pain, No blurred vision  ENT: No change in hearing, No sore throat, No neck pain, No rhinorrhea, No ear pain  RESPIRATORY: No shortness of breath, No cough  CARDIAC: No chest pain, No palpitations  GI: No abdominal pain, No nausea, No vomiting, No diarrhea, No constipation, No bright red blood per rectum or melena. + flank pain  : No dysuria, frequency, +hematuria  MUSCULOSKELETAL: No joint paint, + swelling, + leg pain, No back pain  NEUROLOGIC: No numbness, No focal weakness, No headache, No dizziness  All other systems negative, unless specified in HPI

## 2021-10-22 NOTE — CONSULT NOTE ADULT - ASSESSMENT
A/P:  35yo , LMP 9/4/21, s/p laparoscopic right salpingectomy and D&C for ruptured ectopic pregnancy on 10/20, POD#2, with LE swelling, duplex negative for DVT, clinically and hemodynamically stable  - no acute GYN intervention  - bleeding and infection precautions  - continue tylenol and ibuprofen for pain management PRN  - follow up with Dr. Villafuerte for postoperative visit  - dispo per ED    Dr. Villafuerte aware,

## 2021-10-22 NOTE — CONSULT NOTE ADULT - SUBJECTIVE AND OBJECTIVE BOX
Chief Complaint: lower extremity     HPI: 37yo , LMP 21, s/p laparoscopic right salpingectomy and D&C for ruptured ectopic pregnancy on 10/20, POD#2, presenting to the ED with RLE swelling since yesterday. Reports she noticed her right leg was swollen more than her left, associated with some numbness and tingling. Reports mild incisional pain relieved with PO pain meds and vaginal spotting, denies fever, chills, CP, SOB, N/V, urinary symptoms.       Ob/Gyn History:                   LMP - 10/4/21                  Cycle Length - q28d  Denies history of ovarian cysts, uterine fibroids, abnormal paps, or STIs  OBHx:  FT  x2, required D&C x2 after delivery for retained products of conception  Ruptured left ectopic in , s/p left salpingectomy  Ruptured right ectopic s/p right salpingectomy on 10/20      Denies the following: constitutional symptoms, visual symptoms, cardiovascular symptoms, respiratory symptoms, GI symptoms, musculoskeletal symptoms, skin symptoms, neurologic symptoms, hematologic symptoms, allergic symptoms, psychiatric symptoms  Except any pertinent positives listed.     Home Medications:  None    Allergies  No Known Allergies      PAST MEDICAL & SURGICAL HISTORY:  Asthma  Anxiety  History of hemorrhoidectomy        FAMILY HISTORY:  Denies any significant family history    SOCIAL HISTORY: Denies cigarette use, alcohol use, or illicit drug use    Vital Signs Last 24 Hrs  T(F): 99.1 (22 Oct 2021 18:43), Max: 99.1 (22 Oct 2021 18:43)  HR: 93 (22 Oct 2021 18:43) (93 - 93)  BP: 120/64 (22 Oct 2021 18:43) (120/64 - 120/64)  RR: 18 (22 Oct 2021 18:43) (18 - 18)    General Appearance - AAOx3, NAD  Heart - S1S2 regular rate and rhythm  Lung - CTA Bilaterally  Abdomen - Soft, nontender, nondistended, no rebound, no rigidity, no guarding, bowel sounds present. Steris in place over laparoscopic incision, no drainage or erythema  Tenderness - None  Ext: mild bilateral edema, no tenderness to palpation, strength and sensation 5/5 in bilateral lower extremities    LABS:    HCG Quantitative, Serum: 3208.0 mIU/mL (10-20-21 @ 19:44)  HCG Quantitative, Serum: 3330.0 mIU/mL (10-18-21 @ 20:18)              Urinalysis Basic - ( 22 Oct 2021 20:20 )    Color: Colorless / Appearance: Clear / S.008 / pH: x  Gluc: x / Ketone: Negative  / Bili: Negative / Urobili: <2 mg/dL   Blood: x / Protein: Negative / Nitrite: Negative   Leuk Esterase: Negative / RBC: x / WBC x   Sq Epi: x / Non Sq Epi: x / Bacteria: x        Culture - Urine (collected 10-20-21 @ 18:00)  Source: Clean Catch Clean Catch (Midstream)  Final Report (10-22-21 @ 00:12):    No growth    Culture - Urine (collected 10-18-21 @ 22:25)  Source: Clean Catch Clean Catch (Midstream)  Final Report (10-20-21 @ 12:22):    <10,000 CFU/mL Normal Urogenital Ophelia      RADIOLOGY & ADDITIONAL STUDIES:    LE duplex: negative for DVT    re< from: US Retroperitoneal Complete (10.22.21 @ 21:29) >  PROCEDURE DATE:  10/22/2021            INTERPRETATION:  CLINICAL INFORMATION: Flank pain    COMPARISON: None available.    TECHNIQUE: Sonography of the kidneys and bladder.    FINDINGS:    Right kidney:10.5 cm. No hydronephrosis or calculi. Normal cortical thickness and echogenicity    Left kidney:  10.0 cm. No hydronephrosis or calculi. Normal cortical thickness and echogenicity.    IMPRESSION:    Unremarkable renal ultrasound.    < end of copied text >

## 2021-10-25 LAB
CULTURE RESULTS: SIGNIFICANT CHANGE UP
SPECIMEN SOURCE: SIGNIFICANT CHANGE UP
SURGICAL PATHOLOGY STUDY: SIGNIFICANT CHANGE UP

## 2021-10-27 ENCOUNTER — NON-APPOINTMENT (OUTPATIENT)
Age: 36
End: 2021-10-27

## 2021-10-27 DIAGNOSIS — F41.9 ANXIETY DISORDER, UNSPECIFIED: ICD-10-CM

## 2021-10-27 DIAGNOSIS — O00.111 RIGHT TUBAL PREGNANCY WITH INTRAUTERINE PREGNANCY: ICD-10-CM

## 2021-10-27 DIAGNOSIS — Z3A.09 9 WEEKS GESTATION OF PREGNANCY: ICD-10-CM

## 2021-10-27 DIAGNOSIS — K66.1 HEMOPERITONEUM: ICD-10-CM

## 2021-11-30 ENCOUNTER — APPOINTMENT (OUTPATIENT)
Dept: SURGERY | Facility: CLINIC | Age: 36
End: 2021-11-30
Payer: COMMERCIAL

## 2021-11-30 DIAGNOSIS — K43.9 VENTRAL HERNIA W/OUT OBSTRUCTION OR GANGRENE: ICD-10-CM

## 2021-11-30 DIAGNOSIS — K42.9 UMBILICAL HERNIA W/OUT OBSTRUCTION OR GANGRENE: ICD-10-CM

## 2021-11-30 PROCEDURE — 99024 POSTOP FOLLOW-UP VISIT: CPT

## 2021-11-30 NOTE — ASSESSMENT
[FreeTextEntry1] : Aster presents back to the office approximately 7 weeks after the repair of her incarcerated ventral hernia with mesh and the repair of her umbilical hernia with mesh with concerns about a palpable lump in this area. She has already returned to work and has been relatively active. She did have gynecological surgery 2 weeks after her hernia repair and this may be continuing to some of her generalized abdominal symptoms. She still feels mildly bloated.\par \par Physical examination demonstrates a well-healing scar in the supraumbilical region with no evidence of hernia recurrence or delayed wound complications. The fullness she is feeling beneath her incision is where the mesh and muscle were sewn together and this will soften up with time. Her other laparoscopic GYN incisions are also healing nicely. Her abdomen is soft and nontender. She is tolerating a diet and having normal bowel movements. She was dancing this weekend and wants to resume exercising.\par \par Aster was counseled and reassured. She has no specific physical restrictions at this time but was encouraged to return to strenuous physical activity and heavy lifting slowly. She was encouraged to wear an abdominal binder during any significant exercise regimen for at least several weeks. She may return to me in the future if any issues arise him of course.

## 2022-05-02 NOTE — ASU PATIENT PROFILE, ADULT - NPO AFTER
New instructions for today:    Patient Instructions:  Continue current medications as prescribed. Always keep a current medication list. Bring your medications to every office visit. Continue to follow up with primary care provider for non cardiac medical problems. Call the office with any problems, questions or concerns at [phone]. If you have been asked to keep a blood pressure log, do so for 2 weeks. Call the office to report readings to the triage nurse at [phone]. Follow up with cardiologist as scheduled. The following educational material has been included in this after visit summary for your review: Life simple 7. Heart health. Life simple 7  1) Manage blood pressure - high blood pressure is a major risk factor for heart disease and stroke. Keeping blood pressure in health range reduces strain on your heart, arteries and kidneys. Blood pressure goal is less than 130/80. 2) Control cholesterol - contributes to plaque, which can clog arteries and lead to heart disease and stroke. When you control your cholesterol you are giving your arteries their best chance to remain clear. It is recommended that you get cholesterol lab work done once a year. 3) Reduce blood sugar - most of the food we eat is turning into glucose or blood sugar that our body uses for energy. Over time, high levels of blood sugar can damage your heart, kidneys, eyes and nerves. 4) Get active - living an active life is one of the most rewarding gifts you can give yourself and those you love. Simply put, daily physical activity increases your length and quality of life. Strive to exercise 15 minutes most days of the week. 5)  Eat better - A healthy diet is one of your best weapons for fighting cardiovascular disease. When you eat a heart healthy diet, you improve your chances for feeling good and staying healthy for life.   6)  Lose weight - when you shed extra fat an unnecessary pounds, you reduce the burden on your hear, lungs, blood vessels and skeleton. You give yourself the gift of active living, you lower your blood pressure and help yourself feel better. 7) Stop smoking - cigarette smokers have a higher risk of developing cardiovascular disease. If  You smoke, quitting is the best thing you can do for your health. Check American Heart Association on line for more information on Life's Simple 7 and tips for healthy living. A Healthy Heart: Care Instructions  Your Care Instructions     Coronary artery disease, also called heart disease, occurs when a substance called plaque builds up in the vessels that supply oxygen-rich blood to your heart muscle. This can narrow the blood vessels and reduce blood flow. A heart attack happens when blood flow is completely blocked. A high-fat diet, smoking, and other factors increase the risk of heart disease. Your doctor has found that you have a chance of having heart disease. You can do lots of things to keep your heart healthy. It may not be easy, but you can change your diet, exercise more, and quit smoking. These steps really work to lower your chance of heart disease. Follow-up care is a key part of your treatment and safety. Be sure to make and go to all appointments, and call your doctor if you are having problems. It's also a good idea to know your test results and keep a list of the medicines you take. How can you care for yourself at home? Diet  · Use less salt when you cook and eat. This helps lower your blood pressure. Taste food before salting. Add only a little salt when you think you need it. With time, your taste buds will adjust to less salt. · Eat fewer snack items, fast foods, canned soups, and other high-salt, high-fat, processed foods. · Read food labels and try to avoid saturated and trans fats. They increase your risk of heart disease by raising cholesterol levels. · Limit the amount of solid fat-butter, margarine, and shortening-you eat.  Use olive, peanut, or canola oil when you cook. Bake, broil, and steam foods instead of frying them. · Eat a variety of fruit and vegetables every day. Dark green, deep orange, red, or yellow fruits and vegetables are especially good for you. Examples include spinach, carrots, peaches, and berries. · Foods high in fiber can reduce your cholesterol and provide important vitamins and minerals. High-fiber foods include whole-grain cereals and breads, oatmeal, beans, brown rice, citrus fruits, and apples. · Eat lean proteins. Heart-healthy proteins include seafood, lean meats and poultry, eggs, beans, peas, nuts, seeds, and soy products. · Limit drinks and foods with added sugar. These include candy, desserts, and soda pop. Lifestyle changes  · If your doctor recommends it, get more exercise. Walking is a good choice. Bit by bit, increase the amount you walk every day. Try for at least 30 minutes on most days of the week. You also may want to swim, bike, or do other activities. · Do not smoke. If you need help quitting, talk to your doctor about stop-smoking programs and medicines. These can increase your chances of quitting for good. Quitting smoking may be the most important step you can take to protect your heart. It is never too late to quit. · Limit alcohol to 2 drinks a day for men and 1 drink a day for women. Too much alcohol can cause health problems. · Manage other health problems such as diabetes, high blood pressure, and high cholesterol. If you think you may have a problem with alcohol or drug use, talk to your doctor. Medicines  · Take your medicines exactly as prescribed. Call your doctor if you think you are having a problem with your medicine. · If your doctor recommends aspirin, take the amount directed each day. Make sure you take aspirin and not another kind of pain reliever, such as acetaminophen (Tylenol). When should you call for help? [id number] if you have symptoms of a heart attack.  These may include:  · Chest pain or pressure, or a strange feeling in the chest.  · Sweating. · Shortness of breath. · Pain, pressure, or a strange feeling in the back, neck, jaw, or upper belly or in one or both shoulders or arms. · Lightheadedness or sudden weakness. · A fast or irregular heartbeat. After you call 911, the  may tell you to chew 1 adult-strength or 2 to 4 low-dose aspirin. Wait for an ambulance. Do not try to drive yourself. Watch closely for changes in your health, and be sure to contact your doctor if you have any problems. Where can you learn more? Go to https://[employer]. org and sign in to your [employer] account. Enter T571 in the [employer] box to learn more about \"A Healthy Heart: Care Instructions. \"     If you do not have an account, please click on the \"Sign Up Now\" link. Current as of: December 16, 2019               Content Version: 12.5  © 1635-3915 [employer], [employer]. Care instructions adapted under license by [facility] ([facility]). If you have questions about a medical condition or this instruction, always ask your healthcare professional. [address] any warranty or liability for your use of this information. 07:15

## 2022-08-04 ENCOUNTER — APPOINTMENT (OUTPATIENT)
Dept: SURGERY | Facility: CLINIC | Age: 37
End: 2022-08-04

## 2022-11-14 ENCOUNTER — APPOINTMENT (OUTPATIENT)
Dept: SURGERY | Facility: CLINIC | Age: 37
End: 2022-11-14

## 2022-11-21 NOTE — PATIENT PROFILE ADULT - NSTRANSFERBELONGINGSRESP_GEN_A_NUR
Lindsay, please see below in pcp's absence.  Please advise.  Thank You!    Pat returned clinics call regarding below.  Patient's symptoms started Friday.   yes

## 2023-05-11 NOTE — ED ADULT NURSE NOTE - DRUG PRE-SCREENING (DAST -1)
Statement Selected [Number can be texted] : number can be texted [OK  to leave message] : OK  to leave message [Other:___] : [unfilled] [[facility] Provider/Facility] : [facility] Provider/Facility [FreeTextEntry5] : [place] [FreeTextEntry6] : Hao [FreeTextEntry7] : He/His [FreeTextEntry2] : "I am here for increase anxiety." [FreeTextEntry1] : Anxiety/Depression/OCD

## 2024-03-03 ENCOUNTER — NON-APPOINTMENT (OUTPATIENT)
Age: 39
End: 2024-03-03

## 2024-08-12 NOTE — ED PROVIDER NOTE - INTERNATIONAL TRAVEL
Patient thinks she should go back on Trulicity. Dr. Zaldivar thought it would be a good idea as well.     Samira Lovell is requesting a refill of dulaglutide (Trulicity) 1.5 MG/0.5ML pen-injector  for a 30 day supply and would like sent to local pharmacy, [facility].     Patient is waiting there for the meds. Please send over if agreeable ASAP.       No

## 2024-09-13 NOTE — PACU DISCHARGE NOTE - NSCLINEINSERTRD_GEN_ALL_CORE
L-THYROXINE (SYNTHROID) TABS 100MCG         Last Written Prescription Date:  12/26/23  Last Fill Quantity: 45,   # refills: 1  Last Office Visit: 6/3/24  Future Office visit:       Routing refill request to provider for review/approval because:    Thyroid Protocol [id number]/12/2024 11:23 PM   Protocol Details Normal TSH on file in past 12 months     TSH   Date Value Ref Range Status   12/20/2023 0.04 (L) 0.30 - 4.20 uIU/mL Final         
No

## 2024-10-22 NOTE — ASU DISCHARGE PLAN (ADULT/PEDIATRIC) - ASU DC SPECIAL INSTRUCTIONSFT
Diet    Eat light on the day of surgery. Nausea and vomiting can occur after anesthesia,   but usually resolve within 24 hours.  Resume normal diet the following day.      Activity    Rest!  No heavy lifting or strenuous activity.    Medications    Ibuprofen (Advil, Motrin), Naprosyn (Aleve) or Extra-Strength Tylenol for pain.  Hydrocodone for severe pain only.  Your prescription was sent electronically to your pharmacy.  Remember, hydrocodone is a strong narcotic pain reliever which can cause drowsiness, upset stomach and constipation.  It should always be taken with food.  You can use stool softener (Mineral Oil) or laxative (MiraLax or Dulcolax) if constipated.  An antibiotic is given during surgery.  No antibiotic needed at home.   Resume all previous medications.  Resume blood thinners the day after surgery unless told otherwise.    Wound Care    Leave surgical dressing in place.  May shower (dressing is waterproof.)  No pool, ocean, lake, hot-tub or   bath for 3 weeks. If you were given an abdominal binder, please wear it as much as possible (day & night)   for 2 weeks, but you must remove it for showers.  Ice packs to the area intermittently for several days help   with pain and swelling.  Bruising (“black and blue”) is common.  Treatment is…Ice & Rest.
no